# Patient Record
Sex: FEMALE | Race: WHITE
[De-identification: names, ages, dates, MRNs, and addresses within clinical notes are randomized per-mention and may not be internally consistent; named-entity substitution may affect disease eponyms.]

---

## 2018-01-10 ENCOUNTER — HOSPITAL ENCOUNTER (OUTPATIENT)
Dept: HOSPITAL 47 - ORWHC2ENDO | Age: 68
Discharge: HOME | End: 2018-01-10
Payer: MEDICARE

## 2018-01-10 VITALS — RESPIRATION RATE: 16 BRPM

## 2018-01-10 VITALS — HEART RATE: 76 BPM | SYSTOLIC BLOOD PRESSURE: 130 MMHG | DIASTOLIC BLOOD PRESSURE: 65 MMHG

## 2018-01-10 VITALS — BODY MASS INDEX: 38.9 KG/M2

## 2018-01-10 VITALS — TEMPERATURE: 97.6 F

## 2018-01-10 DIAGNOSIS — K21.9: ICD-10-CM

## 2018-01-10 DIAGNOSIS — K57.30: Primary | ICD-10-CM

## 2018-01-10 DIAGNOSIS — Z79.82: ICD-10-CM

## 2018-01-10 DIAGNOSIS — Z88.2: ICD-10-CM

## 2018-01-10 DIAGNOSIS — K44.9: ICD-10-CM

## 2018-01-10 DIAGNOSIS — D64.9: ICD-10-CM

## 2018-01-10 DIAGNOSIS — Z85.828: ICD-10-CM

## 2018-01-10 DIAGNOSIS — Z79.899: ICD-10-CM

## 2018-01-10 DIAGNOSIS — I10: ICD-10-CM

## 2018-01-10 PROCEDURE — 45378 DIAGNOSTIC COLONOSCOPY: CPT

## 2018-01-10 NOTE — P.GSHP
History of Present Illness


H&P Date: 01/10/18


Chief Complaint: Colon cancer screening/anemia





Patient here today for colonoscopy.  She has no bowel related complaints.  No 

rectal bleeding.  She does have findings of anemia.  Last colonoscopy normal.





Past Medical History


Past Medical History: Cancer, GERD/Reflux, Hypertension


Additional Past Medical History / Comment(s): SKIN CA -CHEEK


History of Any Multi-Drug Resistant Organisms: None Reported


Past Surgical History: Cholecystectomy


Additional Past Surgical History / Comment(s): toes nail removed left foot.  

SKIN GRAFT - TO CHEEK FROM BEHIND EAR.  COLONOSCOPY


Past Anesthesia/Blood Transfusion Reactions: Motion Sickness


Smoking Status: Never smoker





- Past Family History


  ** Mother


Family Medical History: No Reported History





Medications and Allergies


 Home Medications











 Medication  Instructions  Recorded  Confirmed  Type


 


Multivitamins, Thera [Multivitamin] 1 each PO DAILY 07/21/14 01/05/18 History


 


Aspirin EC [Ecotrin Low Dose] 81 mg PO DAILY 01/05/18 01/05/18 History


 


Losartan [Cozaar] 50 mg PO HS 01/05/18 01/10/18 History


 


Omeprazole 20 mg PO DAILY PRN 01/05/18 01/10/18 History


 


Vitamin C/Biotin [Hair, Skin and 1 each PO DAILY 01/05/18 01/10/18 History





Nails]    











 Allergies











Allergy/AdvReac Type Severity Reaction Status Date / Time


 


Sulfa (Sulfonamide AdvReac  felt like Verified 01/10/18 08:49





Antibiotics)   ants  





   crawling  





   all over  














Surgical - Exam


 Vital Signs











Temp Pulse Resp BP Pulse Ox


 


 97.6 F   72   18   143/66   97 


 


 01/10/18 08:43  01/10/18 08:43  01/10/18 08:43  01/10/18 08:43  01/10/18 08:43

















Physical exam:


General: Well-developed, well-nourished


HEENT: Normocephalic, sclerae nonicteric


Abdomen: Nontender, nondistended


Extremities: No edema


Neuro: Alert and oriented





Assessment and Plan


(1) Colon cancer screening


Narrative/Plan: 


Will proceed with colonoscopy at this time.  


Current Visit: Yes   Status: Acute   Code(s): Z12.11 - ENCOUNTER FOR SCREENING 

FOR MALIGNANT NEOPLASM OF COLON   SNOMED Code(s): 123151838

## 2018-08-09 ENCOUNTER — HOSPITAL ENCOUNTER (INPATIENT)
Dept: HOSPITAL 47 - EC | Age: 68
LOS: 2 days | Discharge: HOME | DRG: 381 | End: 2018-08-11
Attending: INTERNAL MEDICINE | Admitting: INTERNAL MEDICINE
Payer: MEDICARE

## 2018-08-09 VITALS — BODY MASS INDEX: 36.3 KG/M2

## 2018-08-09 DIAGNOSIS — Z82.0: ICD-10-CM

## 2018-08-09 DIAGNOSIS — Z81.1: ICD-10-CM

## 2018-08-09 DIAGNOSIS — Z80.1: ICD-10-CM

## 2018-08-09 DIAGNOSIS — D62: ICD-10-CM

## 2018-08-09 DIAGNOSIS — Z90.49: ICD-10-CM

## 2018-08-09 DIAGNOSIS — K22.11: Primary | ICD-10-CM

## 2018-08-09 DIAGNOSIS — Z87.81: ICD-10-CM

## 2018-08-09 DIAGNOSIS — K57.90: ICD-10-CM

## 2018-08-09 DIAGNOSIS — K44.9: ICD-10-CM

## 2018-08-09 DIAGNOSIS — Z98.51: ICD-10-CM

## 2018-08-09 DIAGNOSIS — Z88.2: ICD-10-CM

## 2018-08-09 DIAGNOSIS — M17.0: ICD-10-CM

## 2018-08-09 DIAGNOSIS — Z79.82: ICD-10-CM

## 2018-08-09 DIAGNOSIS — M19.91: ICD-10-CM

## 2018-08-09 DIAGNOSIS — Z79.899: ICD-10-CM

## 2018-08-09 DIAGNOSIS — Z82.3: ICD-10-CM

## 2018-08-09 DIAGNOSIS — Z83.3: ICD-10-CM

## 2018-08-09 DIAGNOSIS — K21.9: ICD-10-CM

## 2018-08-09 DIAGNOSIS — I10: ICD-10-CM

## 2018-08-09 DIAGNOSIS — Z85.828: ICD-10-CM

## 2018-08-09 DIAGNOSIS — Z82.49: ICD-10-CM

## 2018-08-09 DIAGNOSIS — Z81.2: ICD-10-CM

## 2018-08-09 LAB
ALBUMIN SERPL-MCNC: 3.8 G/DL (ref 3.5–5)
ALP SERPL-CCNC: 74 U/L (ref 38–126)
ALT SERPL-CCNC: 27 U/L (ref 9–52)
ANION GAP SERPL CALC-SCNC: 9 MMOL/L
AST SERPL-CCNC: 21 U/L (ref 14–36)
BUN SERPL-SCNC: 31 MG/DL (ref 7–17)
CALCIUM SPEC-MCNC: 9 MG/DL (ref 8.4–10.2)
CELLS COUNTED: 100
CHLORIDE SERPL-SCNC: 109 MMOL/L (ref 98–107)
CK SERPL-CCNC: 33 U/L (ref 30–135)
CO2 SERPL-SCNC: 20 MMOL/L (ref 22–30)
ERYTHROCYTE [DISTWIDTH] IN BLOOD BY AUTOMATED COUNT: 2.76 M/UL (ref 3.8–5.4)
ERYTHROCYTE [DISTWIDTH] IN BLOOD: 20.8 % (ref 11.5–15.5)
GLUCOSE SERPL-MCNC: 112 MG/DL (ref 74–99)
HCT VFR BLD AUTO: 19.7 % (ref 34–46)
HGB BLD-MCNC: 5.6 GM/DL (ref 11.4–16)
LYMPHOCYTES # BLD MANUAL: 0.86 K/UL (ref 1–4.8)
MCH RBC QN AUTO: 20.3 PG (ref 25–35)
MCHC RBC AUTO-ENTMCNC: 28.4 G/DL (ref 31–37)
MCV RBC AUTO: 71.5 FL (ref 80–100)
MONOCYTES # BLD MANUAL: 0.1 K/UL (ref 0–1)
NEUTROPHILS NFR BLD MANUAL: 90 %
NEUTS SEG # BLD MANUAL: 8.55 K/UL (ref 1.3–7.7)
PLATELET # BLD AUTO: 421 K/UL (ref 150–450)
POTASSIUM SERPL-SCNC: 4.9 MMOL/L (ref 3.5–5.1)
PROT SERPL-MCNC: 6.6 G/DL (ref 6.3–8.2)
SODIUM SERPL-SCNC: 138 MMOL/L (ref 137–145)
TROPONIN I SERPL-MCNC: <0.012 NG/ML (ref 0–0.03)
WBC # BLD AUTO: 9.5 K/UL (ref 3.8–10.6)

## 2018-08-09 PROCEDURE — 85027 COMPLETE CBC AUTOMATED: CPT

## 2018-08-09 PROCEDURE — 86900 BLOOD TYPING SEROLOGIC ABO: CPT

## 2018-08-09 PROCEDURE — 85730 THROMBOPLASTIN TIME PARTIAL: CPT

## 2018-08-09 PROCEDURE — 83036 HEMOGLOBIN GLYCOSYLATED A1C: CPT

## 2018-08-09 PROCEDURE — 86850 RBC ANTIBODY SCREEN: CPT

## 2018-08-09 PROCEDURE — 96374 THER/PROPH/DIAG INJ IV PUSH: CPT

## 2018-08-09 PROCEDURE — 71046 X-RAY EXAM CHEST 2 VIEWS: CPT

## 2018-08-09 PROCEDURE — 83605 ASSAY OF LACTIC ACID: CPT

## 2018-08-09 PROCEDURE — 82553 CREATINE MB FRACTION: CPT

## 2018-08-09 PROCEDURE — 85025 COMPLETE CBC W/AUTO DIFF WBC: CPT

## 2018-08-09 PROCEDURE — 82550 ASSAY OF CK (CPK): CPT

## 2018-08-09 PROCEDURE — 86901 BLOOD TYPING SEROLOGIC RH(D): CPT

## 2018-08-09 PROCEDURE — 36415 COLL VENOUS BLD VENIPUNCTURE: CPT

## 2018-08-09 PROCEDURE — 93005 ELECTROCARDIOGRAM TRACING: CPT

## 2018-08-09 PROCEDURE — 80053 COMPREHEN METABOLIC PANEL: CPT

## 2018-08-09 PROCEDURE — 84484 ASSAY OF TROPONIN QUANT: CPT

## 2018-08-09 PROCEDURE — 86920 COMPATIBILITY TEST SPIN: CPT

## 2018-08-09 PROCEDURE — 82272 OCCULT BLD FECES 1-3 TESTS: CPT

## 2018-08-09 PROCEDURE — 99285 EMERGENCY DEPT VISIT HI MDM: CPT

## 2018-08-09 PROCEDURE — 96361 HYDRATE IV INFUSION ADD-ON: CPT

## 2018-08-09 PROCEDURE — 43235 EGD DIAGNOSTIC BRUSH WASH: CPT

## 2018-08-09 NOTE — XR
EXAMINATION TYPE: XR chest 2V

 

DATE OF EXAM: 8/9/2018

 

COMPARISON: 4/25/2013

 

HISTORY: Short of breath

 

TECHNIQUE:  Frontal and lateral views of the chest are obtained.

 

FINDINGS:  Heart and mediastinum are normal. Lungs are clear. Diaphragm is normal. There is a large h
iatal hernia. There is no pleural effusion. Bony thorax appears intact.

 

IMPRESSION:  No active cardiopulmonary disease. Hiatal hernia. No change.

## 2018-08-09 NOTE — ED
Recheck HPI





- General


Chief Complaint: Recheck/Abnormal Lab/Rx


Stated Complaint: sent by PCP Low [facility]


Time Seen by Provider: 08/09/18 21:59


Source: patient


Mode of arrival: ambulatory


Limitations: no limitations





- History of Present Illness


Initial Comments: 


68 years old female complaining about low hemoglobin and she said she had a 

black tarry stool for last 3-4 weeks, she had some blood work done and now 

today got a call from the family doctor and advised her to come to the ER his 

hemoglobin was quite low, she is on aspirin area down denies any headaches no 

neck stiffness no chest pain or shortness of breath or symptoms of TIA or CVA.








- Related Data


 Home Medications











 Medication  Instructions  Recorded  Confirmed


 


Multivitamins, Thera [Multivitamin] 1 each PO DAILY 07/21/14 08/09/18


 


Aspirin EC [Ecotrin Low Dose] 81 mg PO DAILY 01/05/18 08/09/18


 


Losartan [Cozaar] 50 mg PO HS 01/05/18 08/09/18


 


Omeprazole 20 mg PO DAILY PRN 01/05/18 08/09/18


 


Vitamin C/Biotin [Hair, Skin and 1 each PO DAILY 01/05/18 08/09/18





Nails]   


 


Calcium Citrate/Vitamin D3 1 tab PO DAILY 08/09/18 08/09/18





[Calcitrate + Vit D Caplet]   


 


Prenatal Vit-Iron-Folic Acid 1 cap PO DAILY 08/09/18 08/09/18





[Prenatal-U Capsule (formulary)]   











 Allergies











Allergy/AdvReac Type Severity Reaction Status Date / Time


 


Sulfa (Sulfonamide AdvReac  felt like Verified 08/09/18 22:28





Antibiotics)   ants  





   crawling  





   all over  














Review of Systems


ROS Statement: 


Those systems with pertinent positive or pertinent negative responses have been 

documented in the HPI.





ROS Other: All systems not noted in ROS Statement are negative.





Past Medical History


Past Medical History: Cancer, GERD/Reflux, Hypertension


Additional Past Medical History / Comment(s): SKIN CA -CHEEK


History of Any Multi-Drug Resistant Organisms: None Reported


Past Surgical History: Cholecystectomy


Additional Past Surgical History / Comment(s): toes nail removed left foot.  

SKIN GRAFT - TO CHEEK FROM BEHIND EAR.  COLONOSCOPY


Past Anesthesia/Blood Transfusion Reactions: Motion Sickness


Past Psychological History: No Psychological Hx Reported


Smoking Status: Never smoker





- Past Family History


  ** Mother


Family Medical History: No Reported History





General Exam





- General Exam Comments


Initial Comments: 


General:  The patient is awake and alert, in no distress, and does not appear 

acutely ill. 


Skin:  Skin is warm and dry and no rashes or lesions are noted. 


Eye:  Pupils are equal, round and reactive to light, extra-ocular movements are 

intact; there is normal conjunctiva bilaterally.  


Ears, nose, mouth and throat:  There are moist mucous membranes and no oral 

lesions. 


Neck:  The neck is supple, there is no tenderness  or JVD.  


Cardiovascular:  There is a regular rate and rhythm. No murmur, rub or gallop 

is appreciated.


Respiratory: To auscultation bilateral, no wheezing no rhonchi no distress  

respiratory wise noticed


Gastrointestinal: tender in epigastric area 


Back:  There is no tenderness to palpation in the midline. There is no obvious 

deformity.


Musculoskeletal:  Normal ROM, no tenderness, There is no pedal edema. There is 

no calf tenderness or swelling. No cords were appreciated.  


Neurological:  CN II-XII intact, Cranial nerves III through XII are intact. 

There are no obvious motor or sensory deficits. Coordination appears grossly 

intact. Speech is normal.


Psychiatric:  Cooperative, appropriate mood & affect, normal judgment.  








Limitations: no limitations





Course


 Vital Signs











  08/09/18 08/09/18 08/09/18





  21:31 22:29 22:41


 


Temperature 98.3 F  


 


Pulse Rate 90  92


 


Respiratory 20 18 18





Rate   


 


Blood Pressure 126/55  104/59


 


O2 Sat by Pulse 100  99





Oximetry   














  08/09/18 08/10/18 08/10/18





  23:00 00:00 00:31


 


Temperature   98.9 F


 


Pulse Rate 82 85 85


 


Respiratory 20 20 20





Rate   


 


Blood Pressure 128/53 135/54 102/52


 


O2 Sat by Pulse 100 100 100





Oximetry   














  08/10/18





  00:34


 


Temperature 98.3 F


 


Pulse Rate 92


 


Respiratory 20





Rate 


 


Blood Pressure 122/64


 


O2 Sat by Pulse 100





Oximetry 








EKG is normal sinus ventricular rate is 85 NJ interval is 134 QRS duration is 

94 QT/QTc is 394/460 review of this EKG does not reveal any ST elevation or ST 

depression





Medical Decision Making





- Lab Data


Result diagrams: 


 08/09/18 22:26





 08/09/18 22:26


 Lab Results











  08/09/18 08/09/18 08/09/18 Range/Units





  22:26 22:26 22:26 


 


WBC   9.5   (3.8-10.6)  k/uL


 


RBC   2.76 L   (3.80-5.40)  m/uL


 


Hgb   5.6 L*   (11.4-16.0)  gm/dL


 


Hct   19.7 L*   (34.0-46.0)  %


 


MCV   71.5 L   (80.0-100.0)  fL


 


MCH   20.3 L   (25.0-35.0)  pg


 


MCHC   28.4 L   (31.0-37.0)  g/dL


 


RDW   20.8 H   (11.5-15.5)  %


 


Plt Count   421   (150-450)  k/uL


 


Neutrophils % (Manual)   90   %


 


Lymphocytes % (Manual)   9   %


 


Monocytes % (Manual)   1   %


 


Neutrophils # (Manual)   8.55 H   (1.3-7.7)  k/uL


 


Lymphocytes # (Manual)   0.86 L   (1.0-4.8)  k/uL


 


Monocytes # (Manual)   0.10   (0-1.0)  k/uL


 


Nucleated RBCs   0   (0-0)  /100 WBC


 


Manual Slide Review   Performed   


 


Polychromasia   Present   


 


Hypochromasia   Marked   


 


Poikilocytosis   Moderate   


 


Anisocytosis   Moderate   


 


Microcytosis   Marked   


 


APTT     (22.0-30.0)  sec


 


Sodium    138  (137-145)  mmol/L


 


Potassium    4.9  (3.5-5.1)  mmol/L


 


Chloride    109 H  ()  mmol/L


 


Carbon Dioxide    20 L  (22-30)  mmol/L


 


Anion Gap    9  mmol/L


 


BUN    31 H  (7-17)  mg/dL


 


Creatinine    1.10 H  (0.52-1.04)  mg/dL


 


Est GFR (CKD-EPI)AfAm    59  (>60 ml/min/1.73 sqM)  


 


Est GFR (CKD-EPI)NonAf    51  (>60 ml/min/1.73 sqM)  


 


Glucose    112 H  (74-99)  mg/dL


 


Plasma Lactic Acid Yomi     (0.7-2.0)  mmol/L


 


Calcium    9.0  (8.4-10.2)  mg/dL


 


Total Bilirubin    0.5  (0.2-1.3)  mg/dL


 


AST    21  (14-36)  U/L


 


ALT    27  (9-52)  U/L


 


Alkaline Phosphatase    74  ()  U/L


 


Total Creatine Kinase  33    ()  U/L


 


CK-MB (CK-2)  0.4    (0.0-2.4)  ng/mL


 


CK-MB (CK-2) Rel Index  1.2    


 


Troponin I  <0.012    (0.000-0.034)  ng/mL


 


Total Protein    6.6  (6.3-8.2)  g/dL


 


Albumin    3.8  (3.5-5.0)  g/dL


 


Stool Occult Blood     (Negative)  


 


Blood Type     


 


Blood Type Confirm     


 


Blood Type Recheck     


 


Antibody Screen     


 


Crossmatch     


 


Spec Expiration Date     














  08/09/18 08/09/18 08/09/18 Range/Units





  22:26 22:26 22:27 


 


WBC     (3.8-10.6)  k/uL


 


RBC     (3.80-5.40)  m/uL


 


Hgb     (11.4-16.0)  gm/dL


 


Hct     (34.0-46.0)  %


 


MCV     (80.0-100.0)  fL


 


MCH     (25.0-35.0)  pg


 


MCHC     (31.0-37.0)  g/dL


 


RDW     (11.5-15.5)  %


 


Plt Count     (150-450)  k/uL


 


Neutrophils % (Manual)     %


 


Lymphocytes % (Manual)     %


 


Monocytes % (Manual)     %


 


Neutrophils # (Manual)     (1.3-7.7)  k/uL


 


Lymphocytes # (Manual)     (1.0-4.8)  k/uL


 


Monocytes # (Manual)     (0-1.0)  k/uL


 


Nucleated RBCs     (0-0)  /100 WBC


 


Manual Slide Review     


 


Polychromasia     


 


Hypochromasia     


 


Poikilocytosis     


 


Anisocytosis     


 


Microcytosis     


 


APTT     (22.0-30.0)  sec


 


Sodium     (137-145)  mmol/L


 


Potassium     (3.5-5.1)  mmol/L


 


Chloride     ()  mmol/L


 


Carbon Dioxide     (22-30)  mmol/L


 


Anion Gap     mmol/L


 


BUN     (7-17)  mg/dL


 


Creatinine     (0.52-1.04)  mg/dL


 


Est GFR (CKD-EPI)AfAm     (>60 ml/min/1.73 sqM)  


 


Est GFR (CKD-EPI)NonAf     (>60 ml/min/1.73 sqM)  


 


Glucose     (74-99)  mg/dL


 


Plasma Lactic Acid Yomi     (0.7-2.0)  mmol/L


 


Calcium     (8.4-10.2)  mg/dL


 


Total Bilirubin     (0.2-1.3)  mg/dL


 


AST     (14-36)  U/L


 


ALT     (9-52)  U/L


 


Alkaline Phosphatase     ()  U/L


 


Total Creatine Kinase     ()  U/L


 


CK-MB (CK-2)     (0.0-2.4)  ng/mL


 


CK-MB (CK-2) Rel Index     


 


Troponin I     (0.000-0.034)  ng/mL


 


Total Protein     (6.3-8.2)  g/dL


 


Albumin     (3.5-5.0)  g/dL


 


Stool Occult Blood  Positive H    (Negative)  


 


Blood Type   O Positive   


 


Blood Type Confirm    O Positive  


 


Blood Type Recheck   CABO Indicated   


 


Antibody Screen   NEGATIVE   


 


Crossmatch   See Detail   


 


Spec Expiration Date   08/12/2018 - 2326 08/09/18 08/09/18 Range/Units





  22:33 22:33 


 


WBC    (3.8-10.6)  k/uL


 


RBC    (3.80-5.40)  m/uL


 


Hgb    (11.4-16.0)  gm/dL


 


Hct    (34.0-46.0)  %


 


MCV    (80.0-100.0)  fL


 


MCH    (25.0-35.0)  pg


 


MCHC    (31.0-37.0)  g/dL


 


RDW    (11.5-15.5)  %


 


Plt Count    (150-450)  k/uL


 


Neutrophils % (Manual)    %


 


Lymphocytes % (Manual)    %


 


Monocytes % (Manual)    %


 


Neutrophils # (Manual)    (1.3-7.7)  k/uL


 


Lymphocytes # (Manual)    (1.0-4.8)  k/uL


 


Monocytes # (Manual)    (0-1.0)  k/uL


 


Nucleated RBCs    (0-0)  /100 WBC


 


Manual Slide Review    


 


Polychromasia    


 


Hypochromasia    


 


Poikilocytosis    


 


Anisocytosis    


 


Microcytosis    


 


APTT  19.8 L   (22.0-30.0)  sec


 


Sodium    (137-145)  mmol/L


 


Potassium    (3.5-5.1)  mmol/L


 


Chloride    ()  mmol/L


 


Carbon Dioxide    (22-30)  mmol/L


 


Anion Gap    mmol/L


 


BUN    (7-17)  mg/dL


 


Creatinine    (0.52-1.04)  mg/dL


 


Est GFR (CKD-EPI)AfAm    (>60 ml/min/1.73 sqM)  


 


Est GFR (CKD-EPI)NonAf    (>60 ml/min/1.73 sqM)  


 


Glucose    (74-99)  mg/dL


 


Plasma Lactic Acid Yomi   0.9  (0.7-2.0)  mmol/L


 


Calcium    (8.4-10.2)  mg/dL


 


Total Bilirubin    (0.2-1.3)  mg/dL


 


AST    (14-36)  U/L


 


ALT    (9-52)  U/L


 


Alkaline Phosphatase    ()  U/L


 


Total Creatine Kinase    ()  U/L


 


CK-MB (CK-2)    (0.0-2.4)  ng/mL


 


CK-MB (CK-2) Rel Index    


 


Troponin I    (0.000-0.034)  ng/mL


 


Total Protein    (6.3-8.2)  g/dL


 


Albumin    (3.5-5.0)  g/dL


 


Stool Occult Blood    (Negative)  


 


Blood Type    


 


Blood Type Confirm    


 


Blood Type Recheck    


 


Antibody Screen    


 


Crossmatch    


 


Spec Expiration Date    














Disposition


Clinical Impression: 


 GI bleed





Disposition: ADMITTED AS IP TO THIS [facility]


Condition: Good


Referrals: 


Vinay Alicia MD [Primary Care Provider] - 1-2 days

## 2018-08-10 LAB
ERYTHROCYTE [DISTWIDTH] IN BLOOD BY AUTOMATED COUNT: 2.92 M/UL (ref 3.8–5.4)
ERYTHROCYTE [DISTWIDTH] IN BLOOD BY AUTOMATED COUNT: 3.41 M/UL (ref 3.8–5.4)
ERYTHROCYTE [DISTWIDTH] IN BLOOD BY AUTOMATED COUNT: 3.47 M/UL (ref 3.8–5.4)
ERYTHROCYTE [DISTWIDTH] IN BLOOD: 20.4 % (ref 11.5–15.5)
ERYTHROCYTE [DISTWIDTH] IN BLOOD: 20.6 % (ref 11.5–15.5)
ERYTHROCYTE [DISTWIDTH] IN BLOOD: 21.6 % (ref 11.5–15.5)
HBA1C MFR BLD: 5.5 % (ref 4–6)
HCT VFR BLD AUTO: 22.7 % (ref 34–46)
HCT VFR BLD AUTO: 26.1 % (ref 34–46)
HCT VFR BLD AUTO: 26.5 % (ref 34–46)
HGB BLD-MCNC: 6.8 GM/DL (ref 11.4–16)
HGB BLD-MCNC: 8.1 GM/DL (ref 11.4–16)
HGB BLD-MCNC: 8.1 GM/DL (ref 11.4–16)
MCH RBC QN AUTO: 23.1 PG (ref 25–35)
MCH RBC QN AUTO: 23.4 PG (ref 25–35)
MCH RBC QN AUTO: 23.7 PG (ref 25–35)
MCHC RBC AUTO-ENTMCNC: 29.7 G/DL (ref 31–37)
MCHC RBC AUTO-ENTMCNC: 30.6 G/DL (ref 31–37)
MCHC RBC AUTO-ENTMCNC: 31 G/DL (ref 31–37)
MCV RBC AUTO: 76.5 FL (ref 80–100)
MCV RBC AUTO: 76.7 FL (ref 80–100)
MCV RBC AUTO: 78 FL (ref 80–100)
PLATELET # BLD AUTO: 313 K/UL (ref 150–450)
PLATELET # BLD AUTO: 327 K/UL (ref 150–450)
PLATELET # BLD AUTO: 340 K/UL (ref 150–450)
WBC # BLD AUTO: 6.1 K/UL (ref 3.8–10.6)
WBC # BLD AUTO: 7.1 K/UL (ref 3.8–10.6)
WBC # BLD AUTO: 8.3 K/UL (ref 3.8–10.6)

## 2018-08-10 PROCEDURE — 30233N1 TRANSFUSION OF NONAUTOLOGOUS RED BLOOD CELLS INTO PERIPHERAL VEIN, PERCUTANEOUS APPROACH: ICD-10-PCS

## 2018-08-10 RX ADMIN — CEFAZOLIN SCH MLS/HR: 330 INJECTION, POWDER, FOR SOLUTION INTRAMUSCULAR; INTRAVENOUS at 10:06

## 2018-08-10 RX ADMIN — PANTOPRAZOLE SODIUM SCH MG: 40 INJECTION, POWDER, FOR SOLUTION INTRAVENOUS at 20:11

## 2018-08-10 RX ADMIN — Medication SCH EACH: at 10:06

## 2018-08-10 RX ADMIN — CEFAZOLIN SCH: 330 INJECTION, POWDER, FOR SOLUTION INTRAMUSCULAR; INTRAVENOUS at 12:16

## 2018-08-10 RX ADMIN — CEFAZOLIN SCH MLS/HR: 330 INJECTION, POWDER, FOR SOLUTION INTRAMUSCULAR; INTRAVENOUS at 20:12

## 2018-08-10 RX ADMIN — PANTOPRAZOLE SODIUM SCH MG: 40 INJECTION, POWDER, FOR SOLUTION INTRAVENOUS at 12:54

## 2018-08-10 RX ADMIN — PRENATAL W/O A VIT W/ FE FUMARATE-FA CAP 106.5-1 MG SCH EACH: 106.5 CAPSULE ORAL at 10:06

## 2018-08-10 NOTE — P.HPIM
History of Present Illness


H&P Date: 08/10/18


Chief Complaint: Low hemoglobin





This is a 68-year-old  female patient of Dr. Alicia with past 

medical history of gastroesophageal reflux disease, hypertension, skin cancer 

on her cheek, patient gives history that she had lab work done with Dr. Dejesus 

would and then she received a call that her hemoglobin was low and come into 

the emergency center for evaluation.  Patient has had black tarry stools for 

the past 3-4 weeks.  Patient was found to have a hemoglobin of 5.6.  Vital 

signs were stable.  Patient was started on IV Protonix, consult placed with GI 

for EGD she had a screening colonoscopy in 2018 with Dr. Pena that 

showed diverticulosis. Patient has been placed nothing by mouth status.





Review of Systems


All systems: negative


Constitutional: Denies chills, Denies fever, Denies poor appetite, Denies 

weakness


Eyes: denies blurred vision, denies pain


Ears, nose, mouth and throat: Denies headache, Denies sore throat


Cardiovascular: Denies chest pain, Denies shortness of breath, Denies syncope


Respiratory: Denies cough, Denies cough with sputum, Denies dyspnea, Denies 

wheezing


Gastrointestinal: Reports melena, Denies abdominal pain, Denies diarrhea, 

Denies nausea, Denies vomiting


Genitourinary: Denies dysuria, Denies hematuria


Musculoskeletal: Denies myalgias


Integumentary: Denies pruritus, Denies rash


Neurological: Denies numbness, Denies weakness


Psychiatric: Denies anxiety, Denies depression


Endocrine: Denies fatigue, Denies weight change





Past Medical History


Past Medical History: Cancer, GERD/Reflux, Hypertension, Osteoarthritis (OA)


Additional Past Medical History / Comment(s): Anemia, hiatal hernia, 

diverticulosis, basal cell skin cancer R cheek, nose and back-removed, 

arthritis bilateral knees, L clavicle fracture as a child.


History of Any Multi-Drug Resistant Organisms: None Reported


Past Surgical History: Cholecystectomy, Orthopedic Surgery, Tubal Ligation


Additional Past Surgical History / Comment(s): 1/10/18 colonoscopy and EGD with 

prior colonoscopies, R cheek skin cancer removal with graft, nose and back skin 

cancer removals, D&C, cold knife conization for precancer, R knee arthroscopy


Past Anesthesia/Blood Transfusion Reactions: No Reported Reaction


Smoking Status: Never smoker


Additional Past Alcohol Use History / Comment(s): Patient is a lifelong 

nonsmoker.  No illicit drug use.  Occasional alcohol use.  Patient was employed 

as a  at [employer] and is currently retired.





- Past Family History


  ** Mother


Family Medical History: CVA/TIA, Diabetes Mellitus


Additional Family Medical History / Comment(s): Mother  from a CVA at the 

age of 83 yrs.  She had NIDDM.





  ** Father


Family Medical History: Myocardial Infarction (MI)


Additional Family Medical History / Comment(s): Father  of a MI at the age 

of 60yrs.





  ** Brother(s)


Additional Family Medical History / Comment(s): Patient has total of 10 

siblings.  One brother  age 70 from coronary artery disease, one  at 

age 68 from alcohol complications.  One  due to a pedestrian motor vehicle 

accident.  One brother is alive with no major medical problems.





  ** Sister(s)


Additional Family Medical History / Comment(s): Patient has 1 sister that  

from lung cancer with history of smoking, one sister  from MS, one  

from female cancer, one  from an accident and one  as an infant.





Medications and Allergies


 Home Medications











 Medication  Instructions  Recorded  Confirmed  Type


 


Multivitamins, Thera [Multivitamin] 1 each PO DAILY 14 History


 


Aspirin EC [Ecotrin Low Dose] 81 mg PO DAILY 18 History


 


Losartan [Cozaar] 50 mg PO HS 18 History


 


Omeprazole 20 mg PO DAILY PRN 18 History


 


Vitamin C/Biotin [Hair, Skin and 1 each PO DAILY 18 History





Nails]    


 


Calcium Citrate/Vitamin D3 1 tab PO DAILY 18 History





[Calcitrate + Vit D Caplet]    


 


Prenatal Vit-Iron-Folic Acid 1 cap PO DAILY 18 History





[Prenatal-U Capsule (formulary)]    











 Allergies











Allergy/AdvReac Type Severity Reaction Status Date / Time


 


Sulfa (Sulfonamide AdvReac  felt like Verified 18 22:28





Antibiotics)   ants  





   crawling  





   all over  














Physical Exam


Vitals: 


 Vital Signs











  Temp Pulse Pulse Resp BP BP Pulse Ox


 


 08/10/18 12:50  98.4 F  87   18  115/65   100


 


 08/10/18 12:40  98.4 F  81   18  113/65   98


 


 08/10/18 12:00  98.4 F   82  18   113/65  98


 


 08/10/18 10:03  98 F  80   16  141/87   100


 


 08/10/18 06:40   80   18  104/36   100


 


 08/10/18 04:10  98.8 F  79   16  109/39   100


 


 08/10/18 04:05  97.9 F  84   18  109/39   100


 


 08/10/18 01:14  98.1 F  92   17  108/87   100


 


 08/10/18 00:44  98.3 F  87   20  123/51   100


 


 08/10/18 00:34  98.3 F  92   20  122/64   100


 


 08/10/18 00:31  98.9 F  85   20  102/52   100


 


 08/10/18 00:00   85   20  135/54   100


 


 18 23:00   82   20  128/53   100


 


 18 22:41   92   18  104/59   99


 


 18 22:29     18   


 


 18 21:31  98.3 F  90   20  126/55   100








 Intake and Output











 08/09/18 08/10/18 08/10/18





 22:59 06:59 14:59


 


Intake Total  310 0


 


Balance  310 0


 


Intake:   


 


  Blood Product  310 0


 


    Rc As-1  Unit  310 





    [id number]   


 


    Rc As-1  Unit   0





    [id number]   


 


Other:   


 


  Weight 92.986 kg  92.986 kg

















Gen: This is a 68-year-old  female.  She is sitting up in bed appears 

to be comfortable in no acute distress.


HEENT: Head is atraumatic, normocephalic. Pupils equal, round. Sclerae is 

anicteric.  Return to the following panel.


NECK: Supple. No JVD. No lymphadenopathy. No thyromegaly. 


LUNGS: Clear to auscultation. No wheezes or rhonchi.  No intercostal 

retractions.


HEART: Regular rate and rhythm. No murmur. 


ABDOMEN: Soft. Bowel sounds are present. No masses.  No tenderness.


EXTREMITIES: No pedal edema.  No calf tenderness.


NEUROLOGICAL: Patient is awake, alert and oriented x3. Cranial nerves 2 through 

12 are grossly intact. 








Results


CBC & Chem 7: 


 08/10/18 04:56





 18 22:26


Labs: 


 Abnormal Lab Results - Last 24 Hours (Table)











  18 Range/Units





  22:26 22:26 22:26 


 


RBC  2.76 L    (3.80-5.40)  m/uL


 


Hgb  5.6 L*    (11.4-16.0)  gm/dL


 


Hct  19.7 L*    (34.0-46.0)  %


 


MCV  71.5 L    (80.0-100.0)  fL


 


MCH  20.3 L    (25.0-35.0)  pg


 


MCHC  28.4 L    (31.0-37.0)  g/dL


 


RDW  20.8 H    (11.5-15.5)  %


 


Neutrophils # (Manual)  8.55 H    (1.3-7.7)  k/uL


 


Lymphocytes # (Manual)  0.86 L    (1.0-4.8)  k/uL


 


APTT     (22.0-30.0)  sec


 


Chloride   109 H   ()  mmol/L


 


Carbon Dioxide   20 L   (22-30)  mmol/L


 


BUN   31 H   (7-17)  mg/dL


 


Creatinine   1.10 H   (0.52-1.04)  mg/dL


 


Glucose   112 H   (74-99)  mg/dL


 


Stool Occult Blood    Positive H  (Negative)  


 


Crossmatch     














  08/09/18 08/09/18 08/10/18 Range/Units





  22:26 22:33 04:56 


 


RBC    2.92 L  (3.80-5.40)  m/uL


 


Hgb    6.8 L*  (11.4-16.0)  gm/dL


 


Hct    22.7 L  (34.0-46.0)  %


 


MCV    78.0 L D  (80.0-100.0)  fL


 


MCH    23.1 L  (25.0-35.0)  pg


 


MCHC    29.7 L  (31.0-37.0)  g/dL


 


RDW    21.6 H  (11.5-15.5)  %


 


Neutrophils # (Manual)     (1.3-7.7)  k/uL


 


Lymphocytes # (Manual)     (1.0-4.8)  k/uL


 


APTT   19.8 L   (22.0-30.0)  sec


 


Chloride     ()  mmol/L


 


Carbon Dioxide     (22-30)  mmol/L


 


BUN     (7-17)  mg/dL


 


Creatinine     (0.52-1.04)  mg/dL


 


Glucose     (74-99)  mg/dL


 


Stool Occult Blood     (Negative)  


 


Crossmatch  See Detail    














Thrombosis Risk Factor Assmnt





- DVT/VTE Prophylaxis


DVT/VTE Prophylaxis: Mechanical Prophylaxis ordered





- Choose All That Apply


Any of the Below Risk Factors Present?: Yes


Each Factor Represents 1 point: Obesity (BMI >25)


Other Risk Factors: Yes


Each Risk Factor Represents 2 Points: Age 61-74 years, Malignancy


Other congenital or acquired thrombophilia - If yes, enter type in comment: No


Thrombosis Risk Factor Assessment Total Risk Factor Score: 5


Thrombosis Risk Factor Assessment Level: High Risk





Assessment and Plan


Plan: 





1.  Acute GI bleed with acute blood loss anemia status post transfusion of 2 

units of packed RBCs.  GI consult for EGD.  Continue Protonix 40 mg IV twice 

daily, IV fluids 0.9 normal saline at 100 mL per hour.  CBC every 8 hours.  

Patient is currently nothing by mouth. 





2.  Hypertension.  Continue losartan 50 mg at bedtime.





3.  History of gastroesophageal reflux disease.  Patient is currently on 

Protonix.





4.  History of skin cancer, stable.





5.  DVT prophylaxis.  SCDs and SU hose..








Patient will be admitted to the hospital for a minimum of 2 night stay.


Discharge plan: Return home


Impression and plan of care have been directed as dictated by the signing 

physician.  Erma Wells nurse practitioner acting as scribe for signing 

physician.

## 2018-08-10 NOTE — CONS
CONSULTATION



Mrs. Subramanian is a 68-year-old female, who is seen for cardiac evaluation.  This patient

was asked to come to the emergency room because her hemoglobin was low. Patient has

been having black tarry stools for last 3-4 weeks and the patient's hemoglobin was

significantly low and so the patient was advised to come over here.  The patient claims

that she is not taking aspirin.  The patient has a history of hypertension. There is no

history of diabetes. The patient denies any prior history of myocardial infarction.



HOME MEDICATIONS:

Include Cozaar, omeprazole and calcium.



PAST MEDICAL HISTORY:

Includes a history of cholecystectomy. Patient had a recent colonoscopy this year which

showed evidence of diverticulosis but no other abnormality has been detected. The

patient did not have any upper GI endoscopy done recently.



PHYSICAL EXAMINATION:

At present reveals a 68-year-old female, who does not appear to be in any acute

distress.  The patient's blood pressure is 130/60 mmHg.

HEENT examination is negative.  Neck is supple.  There is no increase in jugular venous

pressure.  Both the carotid pulses are felt.  There is no bruit. Chest is symmetrical.

Heart: The PMI is not felt.  First and second heart sounds are normal.  There is no

evidence of any murmur. Lungs are clinically clear to auscultation and percussion.

Abdomen is soft.  Liver and spleen are not enlarged.  Bowel sounds are heard.

Extremities: Peripheral pulses are 2+.



EKG shows normal sinus rhythm without any acute ischemic changes.  The patient does

have a grade 2/6 at the apex.  Ejection systolic murmur which is suggestive of mild

aortic stenosis.



FINAL IMPRESSION:

This patient is admitted with a history suggestive of upper GI bleeding.  Patient has

significantly low hemoglobin and has a microcytic hypochromic anemia.  The patient may

need upper GI endoscopy to rule out any peptic ulcer disease.  The patient is stable

cardiac wise at present. We will continue her medications and she is advised to stay

away from aspirin and nonsteroidal anti-inflammatory medications.



MMODL / IJN: [id number] / Job#: [id number]

## 2018-08-11 VITALS
SYSTOLIC BLOOD PRESSURE: 112 MMHG | HEART RATE: 77 BPM | TEMPERATURE: 97.8 F | RESPIRATION RATE: 16 BRPM | DIASTOLIC BLOOD PRESSURE: 70 MMHG

## 2018-08-11 LAB
ERYTHROCYTE [DISTWIDTH] IN BLOOD BY AUTOMATED COUNT: 3.27 M/UL (ref 3.8–5.4)
ERYTHROCYTE [DISTWIDTH] IN BLOOD: 20.5 % (ref 11.5–15.5)
HCT VFR BLD AUTO: 25.4 % (ref 34–46)
HGB BLD-MCNC: 7.6 GM/DL (ref 11.4–16)
MCH RBC QN AUTO: 23.3 PG (ref 25–35)
MCHC RBC AUTO-ENTMCNC: 30 G/DL (ref 31–37)
MCV RBC AUTO: 77.7 FL (ref 80–100)
PLATELET # BLD AUTO: 315 K/UL (ref 150–450)
WBC # BLD AUTO: 5.8 K/UL (ref 3.8–10.6)

## 2018-08-11 PROCEDURE — 0DJ08ZZ INSPECTION OF UPPER INTESTINAL TRACT, VIA NATURAL OR ARTIFICIAL OPENING ENDOSCOPIC: ICD-10-PCS

## 2018-08-11 RX ADMIN — PANTOPRAZOLE SODIUM SCH MG: 40 INJECTION, POWDER, FOR SOLUTION INTRAVENOUS at 10:25

## 2018-08-11 RX ADMIN — Medication SCH EACH: at 10:25

## 2018-08-11 RX ADMIN — CEFAZOLIN SCH MLS/HR: 330 INJECTION, POWDER, FOR SOLUTION INTRAMUSCULAR; INTRAVENOUS at 06:27

## 2018-08-11 RX ADMIN — PRENATAL W/O A VIT W/ FE FUMARATE-FA CAP 106.5-1 MG SCH EACH: 106.5 CAPSULE ORAL at 10:25

## 2018-08-11 NOTE — P.DS
Providers


Date of admission: 


08/10/18 00:50





Attending physician: 


Nadia Arciniega MD





Consults: 





 





08/10/18 01:33


Consult Physician Stat 


   Consulting Provider: Kyara Bobby


   Consult Reason/Comments: GI bleed


   Do you want consulting provider notified?: Yes











Primary care physician: 


[facility]inay [facility] Course: 





This is 68 years old female with past medical history significant for 

hypertension presents to the emergency department with fatigue and weakness and 

low hemoglobin identified GI was consulted and patient underwent upper 

endoscopy which showed hiatal hernia and possible small ulcer patient 

hemoglobin continued to be stable during this hospital stay tolerated diet and 

felt stable from the medical standpoint for discharge as patient had recent 

colonoscopy back in January 2017 that showed normal finding at that time 

according to her story.  Patient was evaluated by GI who recommended proton 

pump inhibitors at the time of the discharge cardiology agreed to hold aspirin 

and patient was discharged in stable condition


Patient Condition at Discharge: Good





Plan - Discharge Summary


Discharge Rx Participant: No


New Discharge Prescriptions: 


Continue


   Pantoprazole Sodium [Protonix] 1 tab PO BID 30 Days #60 tablet.


   Sucralfate [Carafate] 10 ml PO Q6H 30 Days #120 oral.susp





No Action


   Multivitamins, Thera [Multivitamin] 1 each PO DAILY


   Losartan [Cozaar] 50 mg PO HS


   Vitamin C/Biotin [Hair, Skin and Nails] 1 each PO DAILY


   Prenatal Vit-Iron-Folic Acid [Prenatal-U Capsule (formulary)] 1 cap PO DAILY


   Calcium Citrate/Vitamin D3 [Calcitrate + Vit D Caplet] 1 tab PO DAILY


   Pantoprazole Sodium [Protonix] 1 tab PO BID


Discharge Medication List





Multivitamins, Thera [Multivitamin] 1 each PO DAILY 07/21/14 [History]


Losartan [Cozaar] 50 mg PO HS 01/05/18 [History]


Vitamin C/Biotin [Hair, Skin and Nails] 1 each PO DAILY 01/05/18 [History]


Calcium Citrate/Vitamin D3 [Calcitrate + Vit D Caplet] 1 tab PO DAILY 08/09/18 [

History]


Prenatal Vit-Iron-Folic Acid [Prenatal-U Capsule (formulary)] 1 cap PO DAILY 08/ 09/18 [History]


Pantoprazole Sodium [Protonix] 1 tab PO BID 08/11/18 [History]


Pantoprazole Sodium [Protonix] 1 tab PO BID 30 Days #60 tablet.dr 08/11/18 [Rx]


Sucralfate [Carafate] 10 ml PO Q6H 30 Days #120 oral.susp 08/11/18 [Rx]








Follow up Appointment(s)/Referral(s): 


Vinay Alicia MD [Primary Care Provider] - 1-2 days

## 2018-08-11 NOTE — P.PCN
Date of Procedure: 08/11/18


Procedure(s) Performed: 


Procedure: Esophagogastroduodenoscopy.





Preoperative diagnosis: GI bleeding and anemia.





Postoperative diagnosis: 1. Hiatal hernia and distal esophageal ulcer not 

bleeding at the time of the exam most likely cause of bleeding and anemia.  2. 

No other abnormalities noted on the upper endoscopy.





Preparation and sedation: Was provided by anesthesia.





Brief clinical history: The patient is a 68-year-old female with past medical 

history of gastroesophageal reflux disease, hypertension and skin cancer on her 

cheek, who presented with history that she had lab work done with her PCP and 

then she received a call that her hemoglobin was low and was asked to come to 

the emergency center for evaluation.  Patient has had black tarry stools for 

the past 3-4 weeks.  Patient was found to have a hemoglobin of 5.6.  Vital 

signs were stable.  Patient was started on IV Protonix, consult placed with our 

service for possible EGD. She had a screening colonoscopy in January 2018 with 

Dr. Pena that showed diverticulosis.  Patient denied any dysphagia, 

odynophagia or any hematemesis or hematochezia.  No abdominal pain or weight 

loss.  The patient was transfused and her hemoglobin is stable at 7.6.  Other 

details are summarized in the history and physical and dictated consultations 

and progress notes.  This evaluation is to assess for a source of bleeding and 

anemia.





Procedure: With the patient on her left lateral decubitus position and after 

informed consent and adequate sedation, I passed a Olympus- video upper 

endoscope through the cricopharyngeus down the esophagus.  GE junction was 

around 34 cm from the incisors and there was a moderately sized hiatal hernia.  

The distal esophagus, close to the GE junction, showed a 1.5-2 cm triangular 

ulceration covered with white exudate with no evidence of active bleeding at 

the time of this exam.  No other pathology was noted in the esophagus such as 

esophageal varices, tumors or mucosal tears.  The endoscope was then passed to 

the rest of the stomach which was insufflated with air and inspected in detail 

including the retroflex view in the cardia.  Finally, the endoscope was passed 

to the pylorus into the duodenum.  Pyloric channel, duodenal bulb, was bulbar 

area and descending duodenum appeared within normal limits.  The stomach 

appeared normal with no ulcers, tumors or bleeding.  All secretions encountered 

during this exam were clear in color with no evidence of bleeding.  No biopsies 

were indicated then the endoscope was withdrawn.





The patient tolerated the procedure well.





Plan: The patient was reassured.  It is likely that her bleeding and anemia is 

originating in her esophagus secondary to reflux.  The patient was the taking 

acid suppressive therapy on an as-needed basis and this can be changed to daily 

until the ulcer heals.  Will monitor her blood counts as outpatient and further 

workup can be made based on her course and blood counts, including further 

workup of her small bowel and, if needed, repeat colon evaluation.

## 2018-11-09 ENCOUNTER — HOSPITAL ENCOUNTER (OUTPATIENT)
Dept: HOSPITAL 47 - ORWHC2ENDO | Age: 68
Discharge: HOME | End: 2018-11-09
Attending: SURGERY
Payer: MEDICARE

## 2018-11-09 VITALS — DIASTOLIC BLOOD PRESSURE: 78 MMHG | SYSTOLIC BLOOD PRESSURE: 163 MMHG | HEART RATE: 60 BPM

## 2018-11-09 VITALS — BODY MASS INDEX: 36.5 KG/M2

## 2018-11-09 VITALS — TEMPERATURE: 98.7 F | RESPIRATION RATE: 16 BRPM

## 2018-11-09 DIAGNOSIS — Z88.2: ICD-10-CM

## 2018-11-09 DIAGNOSIS — K44.9: ICD-10-CM

## 2018-11-09 DIAGNOSIS — K22.2: ICD-10-CM

## 2018-11-09 DIAGNOSIS — K29.50: Primary | ICD-10-CM

## 2018-11-09 DIAGNOSIS — K22.11: ICD-10-CM

## 2018-11-09 DIAGNOSIS — I10: ICD-10-CM

## 2018-11-09 DIAGNOSIS — Z79.899: ICD-10-CM

## 2018-11-09 DIAGNOSIS — Z85.820: ICD-10-CM

## 2018-11-09 DIAGNOSIS — K21.9: ICD-10-CM

## 2018-11-09 PROCEDURE — 43239 EGD BIOPSY SINGLE/MULTIPLE: CPT

## 2018-11-09 PROCEDURE — 88305 TISSUE EXAM BY PATHOLOGIST: CPT

## 2018-11-09 NOTE — P.PCN
Date of Procedure: 11/09/18


Procedure(s) Performed: 


Preoperative Dx: Upper GI bleed


Postoperative Dx: Mild gastritis, moderate size hiatal hernia


Procedure: EGD with Bx


Anesthesia: Sedation


Endoscopist: Dr. Pena


Specimens: Antrum


Endoscopic Procedure:   The patient was on the endoscopy table in the left 

decubitus position.  The Olympus gastroscope was inserted into the oropharynx 

and passed under direct visualization to the region of the third portion of the 

duodenum.  From that point the scope was slowly withdrawn inspecting all 

surfaces carefully.  There were no neoplastic inflammatory or polypoid lesions 

throughout the duodenum.  The pylorus was widely patent.  The stomach was 

carefully inspected.  There was mild gastritis present.  A biopsy of the antrum 

took place to rule out H. pylori.  Retroflexion revealed a moderate sized 

hiatal hernia.  The GE junction was present at 32 cm while the diaphragmatic 

hiatus was present at 38 cm.  The esophagus was then carefully examined.  There 

were no neoplastic inflammatory or polypoid lesions throughout the visualized 

esophagus.  There did appear to be slight narrowing at the GE junction 

consistent with an early Schatzki's ring.  The patient was then taken to the 

recovery room in stable condition per anesthesia guidelines.


Recommendations: Continue antiacids.  Await biopsy results.  If patient 

develops dysphagia symptoms consider esophageal dilation.

## 2019-10-24 NOTE — P.CONS
History of Present Illness





- Reason for Consult


Consult date: 08/10/18


GI bleeding and anemia for possible EGD





- History of Present Illness





The patient is a 68-year-old female with past medical history of 

gastroesophageal reflux disease, hypertension and skin cancer on her cheek, 

presented with history that she had lab work done with her PCP and then she 

received a call that her hemoglobin was low and was asked to come to the 

emergency center for evaluation.  Patient has had black tarry stools for the 

past 3-4 weeks.  Patient was found to have a hemoglobin of 5.6.  Vital signs 

were stable.  Patient was started on IV Protonix, consult placed with our 

service for possible EGD. She had a screening colonoscopy in 2018 with 

Dr. Pean that showed diverticulosis.  Patient denied any dysphagia, 

odynophagia or any hematemesis or hematochezia.  No abdominal pain or weight 

loss.  





Review of Systems





Constitutional: Denied fever, chills or unintentional weight loss


Neurologic: No headaches, double vision or other sensory or motor changes


Cardiopulmonary: No chest pains, shortness of breath or palpitations. History 

of HTN


Gastrointestinal: See present illness above


Genitourinary: No hematuria, dysuria or frequency


Musculoskeletal: No joint complaints or swelling. History of OA


Skin: No rashes


Endocrine: No history of diabetes or thyroid disease


Hematologic: No history of anemia or bleeding tendency


Psychiatric: No anxiety or depression





Past Medical History


Past Medical History: Cancer, GERD/Reflux, Hypertension, Osteoarthritis (OA)


Additional Past Medical History / Comment(s): Anemia, hiatal hernia, 

diverticulosis, basal cell skin cancer R cheek, nose and back-removed, 

arthritis bilateral knees, L clavicle fracture as a child.


History of Any Multi-Drug Resistant Organisms: None Reported


Past Surgical History: Cholecystectomy, Orthopedic Surgery, Tubal Ligation


Additional Past Surgical History / Comment(s): 1/10/18 colonoscopy and EGD with 

prior colonoscopies, R cheek skin cancer removal with graft, nose and back skin 

cancer removals, D&C, cold knife conization for precancer, R knee arthroscopy


Past Anesthesia/Blood Transfusion Reactions: No Reported Reaction


Smoking Status: Never smoker


Additional Past Alcohol Use History / Comment(s): Patient is a lifelong 

nonsmoker.  No illicit drug use.  Occasional alcohol use.  Patient was employed 

as a  at [employer] and is currently retired.





- Past Family History


  ** Mother


Family Medical History: CVA/TIA, Diabetes Mellitus


Additional Family Medical History / Comment(s): Mother  from a CVA at the 

age of 83 yrs.  She had NIDDM.





  ** Father


Family Medical History: Myocardial Infarction (MI)


Additional Family Medical History / Comment(s): Father  of a MI at the age 

of 60yrs.





  ** Brother(s)


Additional Family Medical History / Comment(s): Patient has total of 10 

siblings.  One brother  age 70 from coronary artery disease, one  at 

age 68 from alcohol complications.  One  due to a pedestrian motor vehicle 

accident.  One brother is alive with no major medical problems.





  ** Sister(s)


Additional Family Medical History / Comment(s): Patient has 1 sister that  

from lung cancer with history of smoking, one sister  from MS, one  

from female cancer, one  from an accident and one  as an infant.





Medications and Allergies


 Home Medications











 Medication  Instructions  Recorded  Confirmed  Type


 


Multivitamins, Thera [Multivitamin] 1 each PO DAILY 14 History


 


Aspirin EC [Ecotrin Low Dose] 81 mg PO DAILY 18 History


 


Losartan [Cozaar] 50 mg PO HS 18 History


 


Omeprazole 20 mg PO DAILY PRN 18 History


 


Vitamin C/Biotin [Hair, Skin and 1 each PO DAILY 18 History





Nails]    


 


Calcium Citrate/Vitamin D3 1 tab PO DAILY 18 History





[Calcitrate + Vit D Caplet]    


 


Prenatal Vit-Iron-Folic Acid 1 cap PO DAILY 18 History





[Prenatal-U Capsule (formulary)]    











 Allergies











Allergy/AdvReac Type Severity Reaction Status Date / Time


 


Sulfa (Sulfonamide AdvReac  felt like Verified 18 22:28





Antibiotics)   ants  





   crawling  





   all over  














Physical Exam


Vitals: 


 Vital Signs











  Temp Pulse Pulse Resp BP BP Pulse Ox


 


 08/10/18 15:28  98.4 F   75  18   105/62  99


 


 08/10/18 14:45  98.4 F  66   18  105/62   99


 


 08/10/18 13:20  97.9 F  69   18  112/66  


 


 08/10/18 12:50  98.4 F  87   18  115/65   100


 


 08/10/18 12:40  98.4 F  81   18  113/65   98


 


 08/10/18 12:00  98.4 F   82  18   113/65  98


 


 08/10/18 10:03  98 F  80   16  141/87   100


 


 08/10/18 06:40   80   18  104/36   100


 


 08/10/18 04:10  98.8 F  79   16  109/39   100


 


 08/10/18 04:05  97.9 F  84   18  109/39   100


 


 08/10/18 01:14  98.1 F  92   17  108/87   100


 


 08/10/18 00:44  98.3 F  87   20  123/51   100


 


 08/10/18 00:34  98.3 F  92   20  122/64   100


 


 08/10/18 00:31  98.9 F  85   20  102/52   100


 


 08/10/18 00:00   85   20  135/54   100


 


 18 23:00   82   20  128/53   100


 


 18 22:41   92   18  104/59   99


 


 18 22:29     18   


 


 18 21:31  98.3 F  90   20  126/55   100








 Intake and Output











 08/10/18 08/10/18 08/10/18





 06:59 14:59 22:59


 


Intake Total 310 0 310


 


Balance 310 0 310


 


Intake:   


 


  Blood Product 310 0 310


 


    Rc As-1  Unit 310  





    [id number]   


 


    Rc As-1  Unit  0 310





    [id number]   


 


Other:   


 


  Weight  92.986 kg 














General: Appears stated age, very pleasant in no acute distress


Head and neck: Normocephalic and atraumatic, conjunctivae pink and sclerae not 

icteric, mucous membranes moist and pink, no masses in the neck or tracheal 

shift


Lungs: Clear to auscultation with no dullness to percussion


Heart: Regular, no abnormal sounds, murmurs, gallops or friction rubs


Abdomen: Soft, no masses or organomegaly, no tenderness.  Bowel sounds present


Extremities: No clubbing, cyanosis or edema


Neurologic: Alert and oriented 3.  Cranial nerves grossly intact.  No gross 

sensory or motor abnormalities





Results


CBC & Chem 7: 


 18 05:46





 18 22:26


Labs: 


 Abnormal Lab Results - Last 24 Hours (Table)











  18 Range/Units





  22:26 22:26 22:26 


 


RBC  2.76 L    (3.80-5.40)  m/uL


 


Hgb  5.6 L*    (11.4-16.0)  gm/dL


 


Hct  19.7 L*    (34.0-46.0)  %


 


MCV  71.5 L    (80.0-100.0)  fL


 


MCH  20.3 L    (25.0-35.0)  pg


 


MCHC  28.4 L    (31.0-37.0)  g/dL


 


RDW  20.8 H    (11.5-15.5)  %


 


Neutrophils # (Manual)  8.55 H    (1.3-7.7)  k/uL


 


Lymphocytes # (Manual)  0.86 L    (1.0-4.8)  k/uL


 


APTT     (22.0-30.0)  sec


 


Chloride   109 H   ()  mmol/L


 


Carbon Dioxide   20 L   (22-30)  mmol/L


 


BUN   31 H   (7-17)  mg/dL


 


Creatinine   1.10 H   (0.52-1.04)  mg/dL


 


Glucose   112 H   (74-99)  mg/dL


 


Stool Occult Blood    Positive H  (Negative)  


 


Crossmatch     














  08/09/18 08/09/18 08/10/18 Range/Units





  22:26 22:33 04:56 


 


RBC    2.92 L  (3.80-5.40)  m/uL


 


Hgb    6.8 L*  (11.4-16.0)  gm/dL


 


Hct    22.7 L  (34.0-46.0)  %


 


MCV    78.0 L D  (80.0-100.0)  fL


 


MCH    23.1 L  (25.0-35.0)  pg


 


MCHC    29.7 L  (31.0-37.0)  g/dL


 


RDW    21.6 H  (11.5-15.5)  %


 


Neutrophils # (Manual)     (1.3-7.7)  k/uL


 


Lymphocytes # (Manual)     (1.0-4.8)  k/uL


 


APTT   19.8 L   (22.0-30.0)  sec


 


Chloride     ()  mmol/L


 


Carbon Dioxide     (22-30)  mmol/L


 


BUN     (7-17)  mg/dL


 


Creatinine     (0.52-1.04)  mg/dL


 


Glucose     (74-99)  mg/dL


 


Stool Occult Blood     (Negative)  


 


Crossmatch  See Detail    














  08/10/18 Range/Units





  16:21 


 


RBC  3.41 L  (3.80-5.40)  m/uL


 


Hgb  8.1 L  (11.4-16.0)  gm/dL


 


Hct  26.1 L  (34.0-46.0)  %


 


MCV  76.7 L  (80.0-100.0)  fL


 


MCH  23.7 L  (25.0-35.0)  pg


 


MCHC   (31.0-37.0)  g/dL


 


RDW  20.6 H  (11.5-15.5)  %


 


Neutrophils # (Manual)   (1.3-7.7)  k/uL


 


Lymphocytes # (Manual)   (1.0-4.8)  k/uL


 


APTT   (22.0-30.0)  sec


 


Chloride   ()  mmol/L


 


Carbon Dioxide   (22-30)  mmol/L


 


BUN   (7-17)  mg/dL


 


Creatinine   (0.52-1.04)  mg/dL


 


Glucose   (74-99)  mg/dL


 


Stool Occult Blood   (Negative)  


 


Crossmatch   














Assessment and Plan


Assessment: 





GI bleeding possibly related to peptic ulcer disease or complicated reflux.  

Other etiology including gastric ulcers or malignancy kept in mind in her age 

group.


Plan: 





We will proceed with upper endoscopy tomorrow.  Further plans based on her 

findings. [Today's Date] : [unfilled] [de-identified] : 09/24/2019 [FreeTextEntry1] : Normal Sinus Rhythm\par Normal Axis\par [id number] ms\par  [FreeTextEntry2] : Summary: 1. Normal study. 2. Normal left ventricular size, morphology and systolic function. 3. Bowing of the anterior leaflet of the mitral valve. 4. No pericardial effusion. 5. This was a technically difficult study in a anxious young man with pectus excavatum. [de-identified] : 10/08/2019 [de-identified] : The results of the 24-hour Holter monitor placed at last visit reviewed in detail today. The heart rate ranged from  beats per minute with an average of 88  beats per minute. The predominant rhythm was normal sinus rhythm alternating with sinus tachycardia and sinus arrhythmia. There was one supraventricular premature beat. There were no ventricular premature beats. There were no symptoms reported during the monitoring period.\par

## 2020-03-16 ENCOUNTER — HOSPITAL ENCOUNTER (OUTPATIENT)
Age: 70
Discharge: HOME | End: 2020-03-16
Payer: MEDICARE

## 2020-03-16 DIAGNOSIS — Z01.812: Primary | ICD-10-CM

## 2020-03-16 PROCEDURE — 87070 CULTURE OTHR SPECIMN AEROBIC: CPT

## 2020-06-30 ENCOUNTER — HOSPITAL ENCOUNTER (OUTPATIENT)
Dept: HOSPITAL 47 - LABPAT | Age: 70
Discharge: HOME | End: 2020-06-30
Attending: ORTHOPAEDIC SURGERY
Payer: MEDICARE

## 2020-06-30 DIAGNOSIS — Z01.812: Primary | ICD-10-CM

## 2020-06-30 PROCEDURE — 87070 CULTURE OTHR SPECIMN AEROBIC: CPT

## 2021-10-01 ENCOUNTER — HOSPITAL ENCOUNTER (OUTPATIENT)
Dept: HOSPITAL 47 - LABPAT | Age: 71
Discharge: HOME | End: 2021-10-01
Attending: ORTHOPAEDIC SURGERY
Payer: MEDICARE

## 2021-10-01 DIAGNOSIS — Z01.812: Primary | ICD-10-CM

## 2021-10-01 DIAGNOSIS — M17.11: ICD-10-CM

## 2021-10-01 PROCEDURE — 87070 CULTURE OTHR SPECIMN AEROBIC: CPT

## 2021-10-03 ENCOUNTER — HOSPITAL ENCOUNTER (EMERGENCY)
Dept: HOSPITAL 47 - EC | Age: 71
Discharge: HOME | End: 2021-10-03
Payer: MEDICARE

## 2021-10-03 VITALS
HEART RATE: 72 BPM | RESPIRATION RATE: 19 BRPM | TEMPERATURE: 98.5 F | SYSTOLIC BLOOD PRESSURE: 162 MMHG | DIASTOLIC BLOOD PRESSURE: 88 MMHG

## 2021-10-03 DIAGNOSIS — Z88.2: ICD-10-CM

## 2021-10-03 DIAGNOSIS — K21.9: ICD-10-CM

## 2021-10-03 DIAGNOSIS — I10: ICD-10-CM

## 2021-10-03 DIAGNOSIS — L03.114: Primary | ICD-10-CM

## 2021-10-03 DIAGNOSIS — Z79.899: ICD-10-CM

## 2021-10-03 PROCEDURE — 99282 EMERGENCY DEPT VISIT SF MDM: CPT

## 2021-10-03 NOTE — ED
General Adult HPI





- General


Chief complaint: Skin/Abscess/Foreign Body


Stated complaint: bee sting


Time Seen by Provider: 10/03/21 12:20


Source: patient, RN notes reviewed, old records reviewed


Mode of arrival: ambulatory


Limitations: no limitations





- History of Present Illness


Initial comments: 





This is a 71-year-old female presents emergency Department stating she was stung

by a bee in the left antecubital fossa.  Patient states it happened 2 weeks ago 

but ever since 2 years gotten red and she thinks the redness is getting a little

bit worse per patient states she has been putting some Neosporin on it and he 

continues to be itchy.  Patient denies any fever chills or cough.  Patient 

denies any streaking up the arm.  Patient denies any other problems.





- Related Data


                                Home Medications











 Medication  Instructions  Recorded  Confirmed


 


Multivitamins, Thera [Multivitamin] 1 each PO DAILY 14


 


Losartan [Cozaar] 50 mg PO HS 18


 


Vitamin C/Biotin [Hair, Skin and 1 each PO DAILY 18





Nails]   


 


Calcium Citrate/Vitamin D3 1 tab PO DAILY 18





[Calcitrate + Vit D Caplet]   


 


Pantoprazole Sodium [Protonix] 1 tab PO BID 18


 


Ferrous Sulfate [Feosol] 325 mg PO TID 18








                                  Previous Rx's











 Medication  Instructions  Recorded


 


Sucralfate [Carafate] 10 ml PO Q6H 30 Days #120 oral.susp 18


 


Cephalexin [Keflex] 500 mg PO Q6HR #20 cap 10/03/21











                                    Allergies











Allergy/AdvReac Type Severity Reaction Status Date / Time


 


Sulfa (Sulfonamide AdvReac  felt like Verified 10/03/21 12:20





Antibiotics)   ants  





   crawling  





   all over  














Review of Systems


ROS Statement: 


Those systems with pertinent positive or pertinent negative responses have been 

documented in the HPI.





ROS Other: All systems not noted in ROS Statement are negative.





Past Medical History


Past Medical History: Cancer, GERD/Reflux, Hypertension, Osteoarthritis (OA)


Additional Past Medical History / Comment(s): Anemia, hiatal hernia, 

diverticulosis, basal cell skin cancer R cheek, nose and back-removed, arthritis

 bilateral knees, L clavicle fracture as a child.


History of Any Multi-Drug Resistant Organisms: None Reported


Past Surgical History: Cholecystectomy, Orthopedic Surgery, Tubal Ligation


Additional Past Surgical History / Comment(s): 1/10/18 colonoscopy and EGD with 

prior colonoscopies, R cheek skin cancer removal with graft, nose and back skin 

cancer removals, D&C, cold knife conization for precancer, R knee arthroscopy


Past Anesthesia/Blood Transfusion Reactions: No Reported Reaction


Past Psychological History: No Psychological Hx Reported


Smoking Status: Never smoker


Past Alcohol Use History: Rare


Past Drug Use History: None Reported





- Past Family History


  ** Mother


Family Medical History: CVA/TIA, Diabetes Mellitus


Additional Family Medical History / Comment(s): Mother  from a CVA at the 

age of 83 yrs.  She had NIDDM.





  ** Father


Family Medical History: Myocardial Infarction (MI)


Additional Family Medical History / Comment(s): Father  of a MI at the age 

of 60yrs.





  ** Brother(s)


Additional Family Medical History / Comment(s): Patient has total of 10 

siblings.  One brother  age 70 from coronary artery disease, one  at age

 68 from alcohol complications.  One  due to a pedestrian motor vehicle 

accident.  One brother is alive with no major medical problems.





  ** Sister(s)


Family Medical History: Cancer


Additional Family Medical History / Comment(s): Patient has 1 sister that  

from lung cancer with history of smoking, one sister  from MS, one  from

 female cancer, one  from an accident and one  as an infant.





General Exam





- General Exam Comments


Initial Comments: 





GENERAL 


Patient is well-developed and well-nourished.  Patient is in mild distress.





EYES


Patient's pupils are equal and round.  Extraocular motion is intact





SKIN


Unremarkable





NEURO


The patient is alert and oriented 3





PYSCH


Patient has normal interpersonal interactions.





MUSCULOSKELETAL


Left antecubital fossa has a area of redness measuring about 3 cm in diameter no

 signs of any foreign body


Limitations: no limitations





Course


                                   Vital Signs











  10/03/21





  12:18


 


Temperature 98.5 F


 


Pulse Rate 72


 


Respiratory 19





Rate 


 


Blood Pressure 162/88


 


O2 Sat by Pulse 97





Oximetry 














Disposition


Clinical Impression: 


 Cellulitis of arm





Disposition: HOME SELF-CARE


Condition: Good


Instructions (If sedation given, give patient instructions):  Cellulitis (ED)


Additional Instructions: 


Patient should take Keflex as prescribed and stop using Neosporin.


Prescriptions: 


Cephalexin [Keflex] 500 mg PO Q6HR #20 cap


Is patient prescribed a controlled substance at d/c from ED?: No


Referrals: 


Seda Cooper MD [Primary Care Provider] - 1-2 days

## 2021-10-24 NOTE — HP
HISTORY AND PHYSICAL



DATE OF SURGERY:

10/25/2021



Melody Subramanian is a 71-year-old patient seen with symptomatic right knee

osteoarthritis.  After treatment options were discussed with her, she elected to

proceed with right total knee arthroplasty.  Consent was obtained.  Medical clearance

was provided by Dr. Cooper, cardiac clearance by Dr. Fountain.



PAST MEDICAL HISTORY:

Hypertension, hyperlipidemia, cardiovascular disease.



PAST SURGICAL HISTORY:

Cholecystectomy, D and C, knee arthroscopy.



DAILY MEDICATIONS:

Hydrochlorothiazide, losartan, pravastatin.



ALLERGIES:

SULFA.



SOCIAL HISTORY:

She denies tobacco use.



PHYSICAL EVALUATION OF THE RIGHT KNEE:

Her range of motion is negative 3 to 115.  There is a mild to moderate effusion.  There

is tenderness along the medial joint line, crepitus along the medial and patellofemoral

compartments with range of motion.  There is pain with patellofemoral compression.

Ligaments are stable.  Hip rotation is without pain.  Her distal neurovascular exam is

intact.



RADIOGRAPHS:

Right knee radiographs reveal severe osteoarthritic changes.



IMPRESSION:

1. Right knee osteoarthritis.

2. Hypertension.

3. Hyperlipidemia.

4. Gastroesophageal reflux disease.



PLAN:

Right total knee arthroplasty.





MMODL / IJN: [id number] / Job#: [id number]

## 2021-10-25 ENCOUNTER — HOSPITAL ENCOUNTER (OUTPATIENT)
Dept: HOSPITAL 47 - OR | Age: 71
Setting detail: OBSERVATION
LOS: 2 days | Discharge: HOME HEALTH | End: 2021-10-27
Attending: ORTHOPAEDIC SURGERY | Admitting: ORTHOPAEDIC SURGERY
Payer: MEDICARE

## 2021-10-25 VITALS — BODY MASS INDEX: 38.9 KG/M2

## 2021-10-25 DIAGNOSIS — Z88.2: ICD-10-CM

## 2021-10-25 DIAGNOSIS — E66.9: ICD-10-CM

## 2021-10-25 DIAGNOSIS — Z80.1: ICD-10-CM

## 2021-10-25 DIAGNOSIS — Z82.3: ICD-10-CM

## 2021-10-25 DIAGNOSIS — Z82.49: ICD-10-CM

## 2021-10-25 DIAGNOSIS — Z85.828: ICD-10-CM

## 2021-10-25 DIAGNOSIS — Z87.11: ICD-10-CM

## 2021-10-25 DIAGNOSIS — Z84.89: ICD-10-CM

## 2021-10-25 DIAGNOSIS — D62: ICD-10-CM

## 2021-10-25 DIAGNOSIS — I25.10: ICD-10-CM

## 2021-10-25 DIAGNOSIS — Z90.711: ICD-10-CM

## 2021-10-25 DIAGNOSIS — R06.02: ICD-10-CM

## 2021-10-25 DIAGNOSIS — I11.9: ICD-10-CM

## 2021-10-25 DIAGNOSIS — E78.5: ICD-10-CM

## 2021-10-25 DIAGNOSIS — M17.0: Primary | ICD-10-CM

## 2021-10-25 DIAGNOSIS — Z83.3: ICD-10-CM

## 2021-10-25 DIAGNOSIS — Z82.0: ICD-10-CM

## 2021-10-25 DIAGNOSIS — Z81.1: ICD-10-CM

## 2021-10-25 DIAGNOSIS — Z90.49: ICD-10-CM

## 2021-10-25 DIAGNOSIS — Z79.899: ICD-10-CM

## 2021-10-25 DIAGNOSIS — I35.1: ICD-10-CM

## 2021-10-25 DIAGNOSIS — Z20.822: ICD-10-CM

## 2021-10-25 DIAGNOSIS — K21.9: ICD-10-CM

## 2021-10-25 PROCEDURE — 87635 SARS-COV-2 COVID-19 AMP PRB: CPT

## 2021-10-25 PROCEDURE — 64999 UNLISTED PX NERVOUS SYSTEM: CPT

## 2021-10-25 PROCEDURE — 97161 PT EVAL LOW COMPLEX 20 MIN: CPT

## 2021-10-25 PROCEDURE — 73560 X-RAY EXAM OF KNEE 1 OR 2: CPT

## 2021-10-25 PROCEDURE — 64448 NJX AA&/STRD FEM NRV NFS IMG: CPT

## 2021-10-25 PROCEDURE — 80053 COMPREHEN METABOLIC PANEL: CPT

## 2021-10-25 PROCEDURE — 88300 SURGICAL PATH GROSS: CPT

## 2021-10-25 PROCEDURE — 76942 ECHO GUIDE FOR BIOPSY: CPT

## 2021-10-25 PROCEDURE — 85025 COMPLETE CBC W/AUTO DIFF WBC: CPT

## 2021-10-25 PROCEDURE — 97116 GAIT TRAINING THERAPY: CPT

## 2021-10-25 PROCEDURE — 94760 N-INVAS EAR/PLS OXIMETRY 1: CPT

## 2021-10-25 PROCEDURE — 27447 TOTAL KNEE ARTHROPLASTY: CPT

## 2021-10-25 RX ADMIN — HYDROMORPHONE HYDROCHLORIDE PRN MG: 1 INJECTION, SOLUTION INTRAMUSCULAR; INTRAVENOUS; SUBCUTANEOUS at 20:04

## 2021-10-25 RX ADMIN — PRAVASTATIN SODIUM SCH MG: 20 TABLET ORAL at 21:32

## 2021-10-25 RX ADMIN — DOCUSATE SODIUM AND SENNOSIDES SCH EACH: 50; 8.6 TABLET ORAL at 21:33

## 2021-10-25 RX ADMIN — Medication PRN MG: at 15:28

## 2021-10-25 RX ADMIN — POTASSIUM CHLORIDE SCH: 14.9 INJECTION, SOLUTION INTRAVENOUS at 21:34

## 2021-10-25 RX ADMIN — POTASSIUM CHLORIDE SCH MLS: 14.9 INJECTION, SOLUTION INTRAVENOUS at 11:30

## 2021-10-25 RX ADMIN — Medication PRN ML: at 20:00

## 2021-10-25 RX ADMIN — ENOXAPARIN SODIUM SCH MG: 30 INJECTION SUBCUTANEOUS at 21:33

## 2021-10-25 RX ADMIN — LOSARTAN POTASSIUM SCH MG: 50 TABLET, FILM COATED ORAL at 21:32

## 2021-10-25 RX ADMIN — Medication SCH MG: at 21:32

## 2021-10-25 NOTE — XR
EXAMINATION TYPE: XR knee limited RT

 

DATE OF EXAM: 10/25/2021

 

CLINICAL HISTORY: Postoperative evaluation

 

Two views of the right knee are submitted. 

 

 Identified are changes of total knee arthroplasty with femoral and tibial components appearing well 
seated.  Postsurgical soft tissue changes are noted.  Alignment is anatomic.

## 2021-10-25 NOTE — P.OP
Date of Procedure: 10/25/21


Preoperative Diagnosis: 


Right knee osteoarthritis


Postoperative Diagnosis: 


Right knee osteoarthritis


Procedure(s) Performed: 


Right total knee arthroplasty


Implants: 


1.  Depuy attune size 4 right cruciate retaining cemented femur


2.  Depuy attune size 3 fixed bearing cemented tibial baseplate


3.  Depuy attune size 4 fixed bearing retaining 8 mm polyethylene tibial surface


4.  Depuy attune 38 mm all polyethylene cemented patella





Anesthesia: regional (Abductor canal catheter, Ipack block), spinal


Surgeon: Werner Prajapati


Assistant #1: Jomar Perdomo


Estimated Blood Loss (ml): 47


Pathology: other (Bone)


Condition: stable


Disposition: PACU


Indications for Procedure: 


71-year-old patient seen with symptomatic right knee osteoarthritis.  After 

treatment options were discussed, she elected to proceed with total knee arth

roplasty.


Operative Findings: 


see description of procedure


Description of Procedure: 


Patient was taken to the operative suite after having an adductor canal catheter

placed by the department of anesthesia.  Patient underwent a spinal anesthetic 

by the department of anesthesia.  Patient was given preoperative IV intake 

antibiotics and TXA.  A well-padded tourniquet was placed about the right lower 

extremity.  The lower extremity was then prepped and draped in the normal 

sterile orthopedic fashion.  The extremity was elevated, a tourniquet was 

insufflated to 300.  A standard anterior incision was made sharply through skin.

 Dissection was taken down through the subcutaneous soft tissues down to the 

extensor mechanism.  A medial arthrotomy was performed, patella was everted and 

knee was flexed.  There was advanced osteoarthritis noted.  I introduced my 

distal intramedullary femoral drill.  I then introduced the distal femoral 

cutting jig.  Quan ARCE secured the cutting jig with 2 pins.  I held 

retractors in position while Quan ARCE performed the distal femoral 

resection through the guide area we now removed her distal femoral cutting 

guide.  We now placed our 4-in-1 femoral cutting block and positioned and it was

secured with 2 pins by Quan ARCE while I held the block in position.  The 

distal femoral finishing was now completed.  A proximal tibial cutting guide was

positioned.  I held the guide in the appropriate position with both hands well 

uQan ARCE inserted stabilizing pins into the guide.  Proximal tibial cut 

was made. We now placed a trial femoral component into position, along with an 

appropriate size tibial tray and insert.  We now took the knee through range of 

motion and had full extension good flexion and good overall soft tissue balance 

noted.  The patella was everted and stabilized with 2 towel clips held by Quan ARCE while I performed a flush with patellar quad tendon utilizing a fresh 

sawblade.  We templated the patella, appropriate drill holes were made.  An 

appropriate trial patella was positioned, knee was taken through full range of 

motion with the patella tracking very nicely.  The trial patella was removed.  

Drill holes were made through the femoral component.  All trial components were 

removed after marking off the appropriate rotation of the tibia.  Retractors we

re now positioned along the proximal tibia.  An appropriate keel punch was made 

with the appropriate size tibial guide by myself on Quan ARCE assisted by 

holding retractors.  At this point appropriate size implants were chosen and 

opened.  The joint was irrigated copiously with pulse lavage mechanical 

irrigation.  The posterior capsule was infiltrated with local analgesic.  The 

wound was irrigated with pulse lavage mechanical irrigation.  We mixed 

antibiotic methylmethacrylate.  We placed the knee into flexion.  We placed 

multiple retractors assisted by Quan ARCE to expose the proximal tibia.  

Once the methyl methacrylate was ready, the tibial component was cemented into 

place removing any excess methylmethacrylate form by both myself and Quan ARCE.  The femoral component was cemented into place removing the removing any 

excess methylmethacrylate performed by both myself and Quan ARCE.  We then 

inserted the appropriate size polyethylene tibial insert.  We made sure that it 

was locked into position.  We took the knee into full extension, and then back 

in a flexion making sure we had removed any excess methylmethacrylate.  The 

patellar component was then cemented down and secured with clamp.  Excess 

methylmethacrylate removed.  We kept the knee in full extension, patellar clamp 

in position until methylmethacrylate had hardened.  Once it had hardened the 

patellar clamp was removed.  The knee was taken through full range of motion.  

The patella tracked nicely.  There was good soft tissue balancing.  The 

tourniquet was now released.  Additional hemostasis was achieved via 

electrocautery.  A second gram of TXA was given.  The wound again was irrigated 

with pulse lavage mechanical irrigation.  The superficial soft tissues were 

infiltrated local analgesic.  The extensor mechanism was repaired with Ethibond.

 We checked the repair with range of motion and it was stable.  The subcutaneous

soft tissues were repaired with Vicryl in layers.  The skin was approximated 

with pernio/Dermabond.  Sterile dressings were applied followed by loose web 

roll and Ace bandage.  The patient was transferred to a bed, and taken to 

recovery in stable and satisfactory condition.  Quan ARCE assisted with 

this complex procedure.

## 2021-10-25 NOTE — P.ANPRN
Procedure Note - Anesthesia





- Nerve Block Performed


  ** Right Adductor Canal Infusion


Time Out Performed: Yes (1211)


Date of Procedure: 10/25/21


Procedure Start Time: 12:12


Procedure Stop Time: 12:17


Location of Patient: PreOp


Indication: Acute Post-Operative Pain, Requested by Surgeon


Specifically requested for management of pain by DrLauren: Werner Prajapati


Sedation Type: Sedate with meaningful contact maintained


Preparation: Sterile Prep, Sterile Dressing


Position: Supine


Catheter Depth at Skin (cm): 8


Catheter: Indwelling


Needle Types: Pajunk


Needle Gauge: 21


Ultrasound used to visualize needle placement: Yes


Ultrasound used to observe medication spread: Yes


Injectate: 0.5% Ropivacaine (see comment for volume) (15cc + nacl pf 5cc)


Blood Aspirated: No


Pain Paresthesia on Injection Noted: No


Resistance on Injection: Normal


Image Stored and Saved: Yes


Events: Uneventful and Well Tolerated





  ** Right iPack Single


Time Out Performed: Yes (1211)


Date of Procedure: 10/25/21


Procedure Start Time: 12:18


Procedure Stop Time: 12:23


Location of Patient: PreOp


Indication: Acute Post-Operative Pain, Requested by Surgeon


Specifically requested for management of pain by DrLauren: Werner Prajapati


Sedation Type: Sedate with meaningful contact maintained


Preparation: Sterile Prep


Position: Supine


Catheter: None


Needle Types: Pajunk


Needle Gauge: 21


Ultrasound used to visualize needle placement: Yes


Ultrasound used to observe medication spread: Yes


Injectate: 0.5% Ropivacaine (see comment for volume) (15cc + nacl pf 5cc)


Blood Aspirated: No


Pain Paresthesia on Injection Noted: No


Resistance on Injection: Normal


Image Stored and Saved: Yes


Events: Uneventful and Well Tolerated

## 2021-10-26 LAB
ALBUMIN SERPL-MCNC: 4 G/DL (ref 3.8–4.9)
ALBUMIN/GLOB SERPL: 1.73 G/DL (ref 1.6–3.17)
ALP SERPL-CCNC: 67 U/L (ref 41–126)
ALT SERPL-CCNC: 30 U/L (ref 8–44)
ANION GAP SERPL CALC-SCNC: 12.2 MMOL/L (ref 4–12)
AST SERPL-CCNC: 32 U/L (ref 13–35)
BASOPHILS # BLD AUTO: 0.01 X 10*3/UL (ref 0–0.1)
BASOPHILS NFR BLD AUTO: 0.1 %
BUN SERPL-SCNC: 18.2 MG/DL (ref 9–27)
BUN/CREAT SERPL: 14.44 RATIO (ref 12–20)
CALCIUM SPEC-MCNC: 9.2 MG/DL (ref 8.7–10.3)
CHLORIDE SERPL-SCNC: 103 MMOL/L (ref 96–109)
CO2 SERPL-SCNC: 20.4 MMOL/L (ref 21.6–31.8)
EOSINOPHIL # BLD AUTO: 0 X 10*3/UL (ref 0.04–0.35)
EOSINOPHIL NFR BLD AUTO: 0 %
ERYTHROCYTE [DISTWIDTH] IN BLOOD BY AUTOMATED COUNT: 4.48 X 10*6/UL (ref 4.1–5.2)
ERYTHROCYTE [DISTWIDTH] IN BLOOD: 13.4 % (ref 11.5–14.5)
GLOBULIN SER CALC-MCNC: 2.3 G/DL (ref 1.6–3.3)
GLUCOSE SERPL-MCNC: 112 MG/DL (ref 70–110)
HCT VFR BLD AUTO: 43.3 % (ref 37.2–46.3)
HGB BLD-MCNC: 14.2 G/DL (ref 12–15)
LYMPHOCYTES # SPEC AUTO: 0.9 X 10*3/UL (ref 0.9–5)
LYMPHOCYTES NFR SPEC AUTO: 7.5 %
MCH RBC QN AUTO: 31.7 PG (ref 27–32)
MCHC RBC AUTO-ENTMCNC: 32.8 G/DL (ref 32–37)
MCV RBC AUTO: 96.7 FL (ref 80–97)
MONOCYTES # BLD AUTO: 0.67 X 10*3/UL (ref 0.2–1)
MONOCYTES NFR BLD AUTO: 5.6 %
NEUTROPHILS # BLD AUTO: 10.31 X 10*3/UL (ref 1.8–7.7)
NEUTROPHILS NFR BLD AUTO: 86.5 %
PLATELET # BLD AUTO: 276 X 10*3/UL (ref 140–440)
POTASSIUM SERPL-SCNC: 4.5 MMOL/L (ref 3.5–5.5)
PROT SERPL-MCNC: 6.2 G/DL (ref 6.2–8.2)
SODIUM SERPL-SCNC: 135 MMOL/L (ref 135–145)
WBC # BLD AUTO: 11.93 X 10*3/UL (ref 4.5–10)

## 2021-10-26 RX ADMIN — POTASSIUM CHLORIDE SCH: 14.9 INJECTION, SOLUTION INTRAVENOUS at 05:57

## 2021-10-26 RX ADMIN — HYDROMORPHONE HYDROCHLORIDE PRN MG: 1 INJECTION, SOLUTION INTRAMUSCULAR; INTRAVENOUS; SUBCUTANEOUS at 04:45

## 2021-10-26 RX ADMIN — Medication SCH MG: at 20:52

## 2021-10-26 RX ADMIN — THERA TABS SCH EACH: TAB at 10:10

## 2021-10-26 RX ADMIN — PANTOPRAZOLE SODIUM SCH MG: 40 TABLET, DELAYED RELEASE ORAL at 10:12

## 2021-10-26 RX ADMIN — HYDROMORPHONE HYDROCHLORIDE PRN MG: 1 INJECTION, SOLUTION INTRAMUSCULAR; INTRAVENOUS; SUBCUTANEOUS at 10:44

## 2021-10-26 RX ADMIN — POTASSIUM CHLORIDE SCH MLS/HR: 14.9 INJECTION, SOLUTION INTRAVENOUS at 10:12

## 2021-10-26 RX ADMIN — ENOXAPARIN SODIUM SCH MG: 30 INJECTION SUBCUTANEOUS at 10:12

## 2021-10-26 RX ADMIN — HYDROCODONE BITARTRATE AND ACETAMINOPHEN PRN EACH: 5; 325 TABLET ORAL at 16:17

## 2021-10-26 RX ADMIN — Medication SCH MG: at 16:13

## 2021-10-26 RX ADMIN — LOSARTAN POTASSIUM SCH MG: 50 TABLET, FILM COATED ORAL at 20:52

## 2021-10-26 RX ADMIN — DOCUSATE SODIUM AND SENNOSIDES SCH EACH: 50; 8.6 TABLET ORAL at 20:52

## 2021-10-26 RX ADMIN — Medication SCH EACH: at 10:10

## 2021-10-26 RX ADMIN — POTASSIUM CHLORIDE SCH: 14.9 INJECTION, SOLUTION INTRAVENOUS at 16:11

## 2021-10-26 RX ADMIN — MELOXICAM SCH MG: 7.5 TABLET ORAL at 10:10

## 2021-10-26 RX ADMIN — HYDROCODONE BITARTRATE AND ACETAMINOPHEN PRN EACH: 5; 325 TABLET ORAL at 22:28

## 2021-10-26 RX ADMIN — Medication SCH MG: at 10:10

## 2021-10-26 RX ADMIN — PRAVASTATIN SODIUM SCH MG: 20 TABLET ORAL at 20:52

## 2021-10-26 RX ADMIN — ENOXAPARIN SODIUM SCH MG: 30 INJECTION SUBCUTANEOUS at 20:52

## 2021-10-26 NOTE — P.CONS
History of Present Illness





- Reason for Consult


Consult date: 10/25/21


Medical management


Requesting physician: Werner Prajapati





- Chief Complaint


S/P Right Total knee Arthroplasty





- History of Present Illness





HISTORY OF PRESENT ILLNESS:





This is a 71-year-old  female one of my patient with a previous medical

history significant for hypertension and hypertensive cardio vascular disease 

hyperlipidemia, GERD, history of basal cell carcinoma status post resection, 

osteoarthritis of the right knee underwent right total knee arthroplasty that 

was done successfully by Dr. Prajapati and we were asked to see the patient 

postoperatively for medical management, patient was seen in the recovery phase 2

and she was doing better her blood pressure was a bit elevated, patient did not 

receive any of her hypertension medications, she did receive hydralazine 10 mg 

IV push in the recovery room in her blood pressures around 169/77 I started 

nursing staff to give her the hydrochlorothiazide as well as losartan tonight.  

And monitor the patient blood pressure very closely.  





REVIEW OF SYSTEMS:





Constitutional: No documented fever, no chills, no night sweats.  No weight 

change.  No weakness, fatigue or lethargy.  No daytime sleepiness.


HEENT: No headache.  No blurred vision or double vision, no loss of vision.  No 

loss of Hearing, no ringing in the ears, no dizziness.  No nasal drainage or 

congestion.  No epistaxis.  No sore throat.


Lungs: No shortness of breath, no cough, no sputum production.  No wheezing.  

Reports dyspnea with activity.


Cardiovascular: No chest pain, no lower extremity edema.  No palpitations.  No 

paroxysmal nocturnal dyspnea.  No orthopnea.  No lightheadedness or dizziness.  

No syncopal episodes.


Abdominal: Reports no abdominal pain.  No nausea, vomiting.  No diarrhea.  No 

constipation.  No bloody or tarry stools reports loss of appetite.


Genitourinary: No dysuria, increased frequency, urgency.  No urinary retention.


Musculoskeletal: No myalgias.  No muscle weakness, no gait dysfunction, no 

frequent falls.  No back pain.  No neck pain. right knee pain and pressure. 


Integumentary: No wounds, no lesions.  No rash or pruritus.  No unusual 

bruising.  No change in hair or nails.


Neurologic: No aphasia. No facial droop. No change in mentation. No head injury.

No headache. No paralysis. No paresthesia.


Psychiatric: No depression.  No anxiety.  No mood swings.


Endocrine: No abnormal blood sugars.  No weight change.  





PAST MEDICAL HISTORY:





Hypertension and hypertensive cardiovascular disease.


Hyperlipidemia.


GERD.


BCC





PAST SURGICAL HISTORY:





 cholecystectomy  


 D&C.


Tubal ligation .


Colonoscopy 2018


Basal cell carcinoma removal.


Partial hysterectomy cervix removed only .





SOCIAL HISTORY:





 patient is a lifelong nonsmoker, she denies any alcohol ingestion, she denies 

any drug use or abuse.  





FAMILY HISTORY: 





 father  at age 61 from MI, mother  at age of 83 for CVA and she had a 

history of diabetes mellitus type 2.  Patient had 4 brothers one still alive one

had heart attack at the age of 73 one is alcoholic and had heart disease at the 

age of 68 , 1 hit by a car the age of 3 year, patient had 6 sisters all were 

 1 from Parkinson 1 from MS 1 from one sister with lung cancer.  One 

sister with diabetes and cancer, one sister  from a bike injury and 

brain injury, patient has one son and one daughter who are healthy.  





PHYSICAL EXAMINATION:





General:  71-year-old  female laying down in bed in no apparent 

distress.  


HEENT: Head is atraumatic, normocephalic, pupils were equal round reactive to 

light and recommendation, extraocular muscle movement were intact, sclera 

nonicteric, conjunctivae were pale, mucous membranes of the mouth are somewhat 

dry.


Neck: Supple, no JVP, normal carotid upstroke bilaterally, no lymphadenopathy.


Chest: Decreased breath sounds at the bases, few rhonchi, no extremity wheezes, 

no chest wall tenderness, no intercostal retractions.


Heart: First heart sound is normal, second heart sounds normal I could not 

appreciate any gallop or murmur.  


Abdomen: Soft, nontender, nondistended, positive bowel sounds, there is no 

hepatosplenomegaly.  


Extremities: There is no edema no calf tenderness DP +2 bilaterally, right lower

extremity is wrapped with an Ace wrap there is on Q pump in place 


Neurologic examination: Patient is awake alert and oriented X 3, cranial nerves 

II-12 appear grossly intact, muscle power were 5 out of 5 in upper extremities 

and 5 out of 5 in bilateral lower extremities, deep tendon reflexes normal 

bilaterally.





ASSESSMENT AND PLAN:





1.  Postoperative day #0 status post right total knee arthroplasty.  Patient was

instructed to use incentive spirometer to reduce the incidence of atelectasis 

and healthcare associated pneumonia, continue current pain management as 

outlined by orthopedic surgery, patient will be started on Lovenox 30 mg 

subcutaneous every 12 hours, physical therapy evaluation, likely will be 

discharged home in the next day or so .





2.  Hypertension and hypertensive cardiovascular disease.  Continue patient on 

hydrochlorothiazide 25 mg orally once every day, continue patient on losartan 50

mg orally once every day.  Monitor the patient blood pressure very closely may 

use hydralazine 10 mg IV push every 4 hours as needed for systolic greater than 

or equal to 160 mmHg. 





3.  Hyperlipidemia.  Continue pravastatin 20 mg at bedtime. 





4.  Acute blood loss anemia expected outcome of surgery.  Continue iron to 25 mg

orally 3 times every day, monitor the patient's CBC the next 24 hours.





5.  Osteoarthritis.  Continue patient on meloxicam 7.5 mg orally once every day.





6.  DVT prophylaxis.  Continue Lovenox 30 mg subcutaneously every 12 hours.





7.  GI prophylaxis.  Continue Protonix 40 mg orally once every day.





8.  Thank you Dr. Dr. Prajapati for allowing me to participate in the care of 

your patient we will follow along with you.





Past Medical History


Past Medical History: Cancer, GERD/Reflux, Hyperlipidemia, Hypertension, 

Osteoarthritis (OA)


Additional Past Medical History / Comment(s): Anemia, hiatal hernia, 

diverticulosis, basal cell skin cancer R cheek, nose and back-removed, arthritis

bilateral knees, L clavicle fracture as a child. SEES DR. MCCOLLUM EVERY 6 MONTHS 

FOR LEAKY VALVE


History of Any Multi-Drug Resistant Organisms: None Reported


Past Surgical History: Cholecystectomy, Orthopedic Surgery, Tubal Ligation


Additional Past Surgical History / Comment(s): 1/10/18 colonoscopy and EGD with 

prior colonoscopies, R cheek skin cancer removal with graft, nose and back skin 

cancer removals, D&C, cold knife conization for precancer, R knee arthroscopy


Past Anesthesia/Blood Transfusion Reactions: No Reported Reaction


Smoking Status: Never smoker





- Past Family History


  ** Mother


Family Medical History: CVA/TIA, Diabetes Mellitus


Additional Family Medical History / Comment(s): Mother  from a CVA at the 

age of 83 yrs.  She had NIDDM.





  ** Father


Family Medical History: Myocardial Infarction (MI)


Additional Family Medical History / Comment(s): Father  of a MI at the age 

of 60yrs.





  ** Brother(s)


Additional Family Medical History / Comment(s): Patient has total of 10 

siblings.  One brother  age 70 from coronary artery disease, one  at age

68 from alcohol complications.  One  due to a pedestrian motor vehicle 

accident.  One brother is alive with no major medical problems.





  ** Sister(s)


Family Medical History: Cancer


Additional Family Medical History / Comment(s): Patient has 1 sister that  

from lung cancer with history of smoking, one sister  from MS, one  from

female cancer, one  from an accident and one  as an infant.





Medications and Allergies


                                Home Medications











 Medication  Instructions  Recorded  Confirmed  Type


 


Multivitamins, Thera [Multivitamin] 1 each PO DAILY 07/21/14 10/25/21 History


 


Losartan [Cozaar] 50 mg PO HS 01/05/18 10/25/21 History


 


Vitamin C/Biotin [Hair, Skin and 1 each PO DAILY 01/05/18 10/25/21 History





Nails]    


 


Calcium Citrate/Vitamin D3 1 tab PO DAILY 08/09/18 10/25/21 History





[Calcitrate + Vit D Caplet]    


 


Pantoprazole Sodium [Protonix] 1 tab PO DAILY 08/11/18 10/25/21 History


 


Ferrous Sulfate [Feosol] 325 mg PO TID 11/07/18 10/25/21 History


 


Pravastatin Sodium [Pravachol] 20 mg PO HS 10/25/21 10/25/21 History


 


hydroCHLOROthiazide 25 mg PO DAILY 10/25/21 10/25/21 History








                                    Allergies











Allergy/AdvReac Type Severity Reaction Status Date / Time


 


Sulfa (Sulfonamide AdvReac  felt like Verified 10/21/21 14:35





Antibiotics)   ants  





   crawling  





   all over  














Physical Exam


Vitals: 


                                   Vital Signs











  Temp Pulse Resp BP BP Pulse Ox


 


 10/25/21 18:26   87  14   161/73  100


 


 10/25/21 18:00   80  14   142/69  100


 


 10/25/21 17:45   78  14   145/67  100


 


 10/25/21 17:00   76  18   129/58  93 L


 


 10/25/21 16:00   72  16   136/66  100


 


 10/25/21 15:30   76  14   143/65  100


 


 10/25/21 15:15   73  14   189/86  99


 


 10/25/21 15:00   66  14   200/86  99


 


 10/25/21 14:49   63  14   212/97  100


 


 10/25/21 14:35  96.9 F L  68  16  146/65   96


 


 10/25/21 12:24   66  16  141/66   98


 


 10/25/21 11:18  97.2 F L  75  16  174/72   96








                                Intake and Output











 10/25/21 10/25/21 10/25/21





 06:59 14:59 22:59


 


Intake Total  1151 


 


Output Total  47 


 


Balance  1104 


 


Intake:   


 


  IV  1151 


 


Output:   


 


  Estimated Blood Loss  47 


 


Other:   


 


  Weight  99.6 kg

## 2021-10-26 NOTE — P.PN
Subjective


Progress Note Date: 10/26/21


Principal diagnosis: 





Status post right total knee arthroplasty





Patient was evaluated today bedside, she is resting in her hospital chair. She 

did not yesterday when getting up on a pursestring occasions, she had a very 

difficult time.  This has improved she states, she did well with physical 

therapy.  Currently pain control is adequate with current medications.  She 

denies any headaches, lightheadedness, chest pain or shortness of breath. 





Objective





- Vital Signs


Vital signs: 


                                   Vital Signs











Temp  98.3 F   10/26/21 08:44


 


Pulse  81   10/26/21 08:44


 


Resp  15   10/26/21 00:58


 


BP  126/60   10/26/21 08:44


 


Pulse Ox  91 L  10/26/21 08:44








                                 Intake & Output











 10/25/21 10/26/21 10/26/21





 18:59 06:59 18:59


 


Intake Total 1151  


 


Output Total 47 200 


 


Balance 1104 -200 


 


Weight 99.6 kg 99.6 kg 


 


Intake:   


 


  IV 1151  


 


Output:   


 


  Urine  200 


 


  Estimated Blood Loss 47  


 


Other:   


 


  Voiding Method   Toilet














- Exam





Right lower extremity:





Incision is clean, dry, and intact.  The foam dressing is in good condition.  

There is minimal soft tissue swelling and ecchymosis surrounding the medial and 

lateral aspects of the incision.  Calf is soft, no tenderness with palpation.  

Plantar flexion, dorsiflexion, EHL, FHL are intact.  Sensory exam to light touch

throughout the extremity is intact, dorsal pedis pulses 2+.








- Labs


CBC & Chem 7: 


                                 10/26/21 06:22





                                 10/26/21 06:22


Labs: 


                  Abnormal Lab Results - Last 24 Hours (Table)











  10/26/21 10/26/21 Range/Units





  06:22 06:22 


 


WBC  11.93 H   (4.50-10.00)  X 10*3/uL


 


Neutrophils #  10.31 H   (1.80-7.70)  X 10*3/uL


 


Eosinophils #  0 L   (0.04-0.35)  X 10*3/uL


 


Carbon Dioxide   20.4 L  (21.6-31.8)  mmol/L


 


Anion Gap   12.20 H  (4.00-12.00)  mmol/L


 


Est GFR (CKD-EPI)AfAm   49.0 L  (60.0-200.0)   


 


Est GFR (CKD-EPI)NonAf   42.2 L  (60.0-200.0)   


 


Glucose   112 H  ()  mg/dL














Assessment and Plan


Assessment: 





Postoperative day #1 status post right total knee arthroplasty


Plan: 





Pain control, will continue current medications at this time





GI and DVT prophylaxis, continue subcu Lovenox, we'll likely transition to 

aspirin 81 mg twice a day at discharge





Encourage incentive spirometer





Plan for home physical therapy and nursing of discharge





Medical recommendations





Icing and elevating techniques were discussed





Wound care instructions were discussed, this including showering





Discharge planning: Depending on how patient does later today with ambulating, 

may discharge home, if not plan for discharge on 10/27/2021


Time with Patient: Less than 30

## 2021-10-26 NOTE — P.PN
Progress Note - Text


Progress Note Date: 10/26/21


Patient was seen and evaluated at bedside.  Patient was sitting comfortably in 

her bed.   Status post postoperative day 1 for right total knee arthroplasty 

patient had adductor canal catheter for postop pain control.  Patient rated  

pain at rest 4 -5 out of 10 in severity.  Patient describes  pain is aching, 

throbbing type on the sides of the  knee and back of the knee.  With activity 

patient rated pain levels 6-7 out of 10 in severity . With the help of oral pain

medications patient pain levels are tolerable.   Patient denied any weakness/ 

numbness in his lower extremities. patient denied any fever, pain over the 

catheter site.





Physical exam: 


Patient vital signs stable


Patient is alert awake oriented 3 responding to all questions appropriately


Examination of the catheter site showed  dressing intact,  no leaking fluid 

around the catheter, no redness, no tenderness over. The catheter insertion 

area.





plan:





status post postoperative day 1 for right total knee arthroplasty with adductor 

canal catheter for pain control. Patient was discussed to continue the 

medication  at the rate of 8 mL per hour until the pump is completely empty, and

instructed the patient how to discontinue the catheter.

## 2021-10-27 VITALS — HEART RATE: 82 BPM | TEMPERATURE: 98.4 F | DIASTOLIC BLOOD PRESSURE: 78 MMHG | SYSTOLIC BLOOD PRESSURE: 171 MMHG

## 2021-10-27 VITALS — RESPIRATION RATE: 17 BRPM

## 2021-10-27 RX ADMIN — POTASSIUM CHLORIDE SCH: 14.9 INJECTION, SOLUTION INTRAVENOUS at 08:00

## 2021-10-27 RX ADMIN — Medication SCH MG: at 07:56

## 2021-10-27 RX ADMIN — Medication SCH EACH: at 07:56

## 2021-10-27 RX ADMIN — MELOXICAM SCH MG: 7.5 TABLET ORAL at 07:56

## 2021-10-27 RX ADMIN — THERA TABS SCH EACH: TAB at 07:56

## 2021-10-27 RX ADMIN — PANTOPRAZOLE SODIUM SCH MG: 40 TABLET, DELAYED RELEASE ORAL at 07:55

## 2021-10-27 RX ADMIN — ENOXAPARIN SODIUM SCH MG: 30 INJECTION SUBCUTANEOUS at 07:58

## 2021-10-27 RX ADMIN — HYDROCODONE BITARTRATE AND ACETAMINOPHEN PRN EACH: 5; 325 TABLET ORAL at 07:54

## 2021-10-27 NOTE — P.PN
Subjective


Progress Note Date: 10/27/21


Principal diagnosis: 





Status post right total knee arthroplasty





Patient was evaluated today bedside, she is resting in her hospital chair. 

Currently pain control is adequate with current medications.  She denies any 

headaches, lightheadedness, chest pain or shortness of breath. 





Objective





- Vital Signs


Vital signs: 


                                   Vital Signs











Temp  98.4 F   10/27/21 07:28


 


Pulse  82   10/27/21 07:28


 


Resp  17   10/27/21 07:28


 


BP  171/78   10/27/21 07:28


 


Pulse Ox  98   10/27/21 07:47








                                 Intake & Output











 10/26/21 10/27/21 10/27/21





 18:59 06:59 18:59


 


Other:   


 


  Voiding Method Toilet Toilet 


 


  # Voids 3 3 


 


  # Bowel Movements  0 














- Exam





Right lower extremity:





Incision is clean, dry, and intact.  The foam dressing is in good condition.  

There is minimal soft tissue swelling and ecchymosis surrounding the medial and 

lateral aspects of the incision.  Calf is soft, no tenderness with palpation.  

Plantar flexion, dorsiflexion, EHL, FHL are intact.  Sensory exam to light touch

throughout the extremity is intact, dorsal pedis pulses 2+.








- Labs


CBC & Chem 7: 


                                 10/26/21 06:22





                                 10/26/21 06:22


Labs: 


                  Abnormal Lab Results - Last 24 Hours (Table)











  10/26/21 10/26/21 Range/Units





  06:22 06:22 


 


WBC  11.93 H   (4.50-10.00)  X 10*3/uL


 


Neutrophils #  10.31 H   (1.80-7.70)  X 10*3/uL


 


Eosinophils #  0 L   (0.04-0.35)  X 10*3/uL


 


Carbon Dioxide   20.4 L  (21.6-31.8)  mmol/L


 


Anion Gap   12.20 H  (4.00-12.00)  mmol/L


 


Est GFR (CKD-EPI)AfAm   49.0 L  (60.0-200.0)   


 


Est GFR (CKD-EPI)NonAf   42.2 L  (60.0-200.0)   


 


Glucose   112 H  ()  mg/dL














Assessment and Plan


Assessment: 





Postoperative day #2 status post right total knee arthroplasty


Plan: 





Pain control, plan for discharge with Norco 5 mg/325 mg





GI and DVT prophylaxis, aspirin 81 mg twice a day for 30 days





Encourage incentive spirometer





Plan for home physical therapy and nursing of discharge





Medical recommendations





Icing and elevating techniques were discussed





Wound care instructions were discussed, this including showering





Discharge planning: Plan for discharge home today


Time with Patient: Less than 30

## 2021-10-27 NOTE — P.PN
Subjective


Progress Note Date: 10/26/21


Progress note 10/26/2021








HISTORY OF PRESENT ILLNESS:





This is a 71-year-old  female one of my patient with a previous medical

history significant for hypertension and hypertensive cardio vascular disease 

hyperlipidemia, GERD, history of basal cell carcinoma status post resection, 

osteoarthritis of the right knee underwent right total knee arthroplasty that 

was done successfully by Dr. Prajapati and we were asked to see the patient 

postoperatively for medical management, patient was seen in the recovery phase 2

and she was doing better her blood pressure was a bit elevated, patient did not 

receive any of her hypertension medications, she did receive hydralazine 10 mg 

IV push in the recovery room in her blood pressures around 169/77 I started 

nursing staff to give her the hydrochlorothiazide as well as losartan tonight.  

And monitor the patient blood pressure very closely.  





10/26: Patient is laying down in bed is doing a lot better, she denies any chest

pain, less short of breath, she continues to have some pain in the right knee, 

she elected to see in the hospital for another 24 hours, she will likely be 

medically stable to be discharged tomorrow morning.





REVIEW OF SYSTEMS:





Constitutional: No documented fever, no chills, no night sweats.  No weight 

change.  No weakness, fatigue or lethargy.  No daytime sleepiness.


HEENT: No headache.  No blurred vision or double vision, no loss of vision.  No 

loss of Hearing, no ringing in the ears, no dizziness.  No nasal drainage or 

congestion.  No epistaxis.  No sore throat.


Lungs: No shortness of breath, no cough, no sputum production.  No wheezing.  

Reports dyspnea with activity.


Cardiovascular: No chest pain, no lower extremity edema.  No palpitations.  No 

paroxysmal nocturnal dyspnea.  No orthopnea.  No lightheadedness or dizziness.  

No syncopal episodes.


Abdominal: Reports no abdominal pain.  No nausea, vomiting.  No diarrhea.  No 

constipation.  No bloody or tarry stools reports loss of appetite.


Genitourinary: No dysuria, increased frequency, urgency.  No urinary retention.


Musculoskeletal: No myalgias.  No muscle weakness, no gait dysfunction, no 

frequent falls.  No back pain.  No neck pain. right knee pain and pressure. 


Integumentary: No wounds, no lesions.  No rash or pruritus.  No unusual 

bruising.  No change in hair or nails.


Neurologic: No aphasia. No facial droop. No change in mentation. No head injury.

No headache. No paralysis. No paresthesia.


Psychiatric: No depression.  No anxiety.  No mood swings.


Endocrine: No abnormal blood sugars.  No weight change.  








PHYSICAL EXAMINATION:





General:  71-year-old  female laying down in bed in no apparent 

distress.  


HEENT: Head is atraumatic, normocephalic, pupils were equal round reactive to 

light and recommendation, extraocular muscle movement were intact, sclera 

nonicteric, conjunctivae were pale, mucous membranes of the mouth are somewhat 

dry.


Neck: Supple, no JVP, normal carotid upstroke bilaterally, no lymphadenopathy.


Chest: Decreased breath sounds at the bases, few rhonchi, no extremity wheezes, 

no chest wall tenderness, no intercostal retractions.


Heart: First heart sound is normal, second heart sounds normal I could not appr

eciate any gallop or murmur.  


Abdomen: Soft, nontender, nondistended, positive bowel sounds, there is no 

hepatosplenomegaly.  


Extremities: There is no edema no calf tenderness DP +2 bilaterally, right lower

extremity is wrapped with an Ace wrap there is on Q pump in place 


Neurologic examination: Patient is awake alert and oriented X 3, cranial nerves 

II-12 appear grossly intact, muscle power were 5 out of 5 in upper extremities 

and 5 out of 5 in bilateral lower extremities, deep tendon reflexes normal 

bilaterally.





ASSESSMENT AND PLAN:





1.  Postoperative day #1 status post right total knee arthroplasty.  Patient was

instructed to use incentive spirometer to reduce the incidence of atelectasis 

and healthcare associated pneumonia, continue current pain management as 

outlined by orthopedic surgery, patient will be started on Lovenox 30 mg 

subcutaneous every 12 hours, physical therapy evaluation, likely will be 

discharged home in the next day or so .





2.  Hypertension and hypertensive cardiovascular disease.  Continue patient on 

hydrochlorothiazide 25 mg orally once every day, continue patient on losartan 50

mg orally once every day.  Monitor the patient blood pressure very closely may 

use hydralazine 10 mg IV push every 4 hours as needed for systolic greater than 

or equal to 160 mmHg. 





3.  Hyperlipidemia.  Continue pravastatin 20 mg at bedtime. 





4.  Acute blood loss anemia expected outcome of surgery.  Continue iron to 25 mg

orally 3 times every day, monitor the patient's CBC the next 24 hours.





5.  Osteoarthritis.  Continue patient on meloxicam 7.5 mg orally once every day.





6.  DVT prophylaxis.  Continue Lovenox 30 mg subcutaneously every 12 hours.





7.  GI prophylaxis.  Continue Protonix 40 mg orally once every day.





8. Medically stable for discharge in AM.





Objective





- Vital Signs


Vital signs: 


                                   Vital Signs











Temp  98.1 F   10/26/21 00:58


 


Pulse  86   10/26/21 00:58


 


Resp  15   10/26/21 00:58


 


BP  121/69   10/26/21 00:58


 


Pulse Ox  95   10/26/21 00:58








                                 Intake & Output











 10/25/21 10/25/21 10/26/21





 06:59 18:59 06:59


 


Intake Total  1151 


 


Output Total  47 200


 


Balance  1104 -200


 


Weight  99.6 kg 99.6 kg


 


Intake:   


 


  IV  1151 


 


Output:   


 


  Urine   200


 


  Estimated Blood Loss  47 














- Labs


CBC & Chem 7: 


                                 10/26/21 06:22





                                 10/26/21 06:22

## 2021-10-27 NOTE — P.DS
Providers


Date of admission: 


10/26/21 14:56





Expected date of discharge: 10/27/21


Attending physician: 


Werner Prajapati





Consults: 





                                        





10/25/21 14:13


Consult Physician Routine 


   Consulting Provider: Seda Cooper


   Consult Reason/Comments: Medical management


   Do you want consulting provider notified?: Yes











Primary care physician: 


Seda Cooper





Hospital Course: 





Date of admission: 10/25/2021


Date of discharge: 10/27/2021





Admission diagnosis: Status post right total knee arthroplasty


Discharge diagnosis: Same





Attending physician: Dr. Prajapati





Surgical procedures: Right total knee arthroplasty





Brief history: Patient is a 71-year-old female with a history of progressive 

primary right knee osteoarthritis.  At this point patient has failed 

conservative treatment measures and has opted to proceed with a elective right 

total knee arthroplasty.





Hospital course: Details of patient's surgery can be found in operative report. 

Patient tolerated the procedure well and was subsequently transported to 

orthopedic floor.  Patient's orthopeidc and medical care was provided daily. 

Patient had daily laboratory tests performed for evaluation of overall blood 

counts.  Patient had daily physical therapy to include strengthening range of 

motion as well as education with walker ambulation. Patient was treated with 

Lovenox for their postoperative DVT prophylaxis during their inpatient stay.  

Patient was noted to have a relatively uneventful postoperative course.  Patient

reported satisfactory pain control with oral pain medications by postoperative 

day 1.  Patient showed satisfactory progress with physical therapy.  Patient 

moved steadily through the program and had no difficulty meeting the goals by 

postoperative day 2.  Given patient's otherwise satisfactory course and having 

met physical therapy goals, plan is to discharge patient home on postoperative 

day 2.





Discharge condition/disposition: Patient will be discharged home in stable 

condition.





Discharge medications: Instructions are given on resumption of patient's normal 

daily medications per primary care recommendation, in addition patient will be 

prescribed Norco 5 mg/25 mg, Colace 100 mg, aspirin 81 mg.





Discharge instructions:


1.  Wound care and infection precautions, keep incision dry and covered while 

showering, no lotions, creams, moisturizers. No soaking, tubs, pools, hottubs. 

Do not scrub over the incision.


2.  Weight-bear as tolerated with walker / cane until follow-up.


3.  Ice and elevate when necessary.  Do not exceed 20 minutes per hour with ice 

pack.


4.  Utilize compression sleeve until seen at first follow up appointment.


5.  Visiting nursing care.


6.  Home physical therapy including home CPM.


7.  Pain meds and anticoagulants per prescription.


8.  Pain medication has potential to cause constipation. Increase oral fluid and

fiber intake. Contact primary care provider if you have not had a bowel movement

within 48 hours after discharge


9.  No anti-inflammatory medication until discussed at first post operative 

visit, this including Motrin, Aleve, Mobic, Diclofenac.


10.  Follow up in office at 2 weeks postop with Quan Perdomo PA-C/Jean Marie JIMENEZ


11. Follow up with your primary care doctor 7-10 days after discharge.


12. Contact [facility] with any questions, [phone].











Procedures: 





Right total knee arthroplasty


Patient Condition at Discharge: Good





Plan - Discharge Summary


Discharge Rx Participant: Yes


New Discharge Prescriptions: 


New


   Docusate [Colace] 100 mg PO DAILY #30 capsule


   Aspirin [Adult Low Dose Aspirin EC] 81 mg PO BID #60 tab


   HYDROcodone/APAP 5-325MG [Norco 5-325] 1 - 2 tab PO Q6HR PRN #42 tab


     PRN Reason: Pain





No Action


   Multivitamins, Thera [Multivitamin] 1 each PO DAILY


   Losartan [Cozaar] 50 mg PO HS


   Vitamin C/Biotin [Hair, Skin and Nails] 1 each PO DAILY


   Calcium Citrate/Vitamin D3 [Calcitrate + Vit D Caplet] 1 tab PO DAILY


   Pantoprazole Sodium [Protonix] 1 tab PO DAILY


   Ferrous Sulfate [Feosol] 325 mg PO TID


   hydroCHLOROthiazide 25 mg PO DAILY


   Pravastatin Sodium [Pravachol] 20 mg PO HS


Discharge Medication List





Multivitamins, Thera [Multivitamin] 1 each PO DAILY 07/21/14 [History]


Losartan [Cozaar] 50 mg PO HS 01/05/18 [History]


Vitamin C/Biotin [Hair, Skin and Nails] 1 each PO DAILY 01/05/18 [History]


Calcium Citrate/Vitamin D3 [Calcitrate + Vit D Caplet] 1 tab PO DAILY 08/09/18 

[History]


Pantoprazole Sodium [Protonix] 1 tab PO DAILY 08/11/18 [History]


Ferrous Sulfate [Feosol] 325 mg PO TID 11/07/18 [History]


Pravastatin Sodium [Pravachol] 20 mg PO HS 10/25/21 [History]


hydroCHLOROthiazide 25 mg PO DAILY 10/25/21 [History]


Aspirin [Adult Low Dose Aspirin EC] 81 mg PO BID #60 tab 10/27/21 [Rx]


Docusate [Colace] 100 mg PO DAILY #30 capsule 10/27/21 [Rx]


HYDROcodone/APAP 5-325MG [Norco 5-325] 1 - 2 tab PO Q6HR PRN #42 tab 10/27/21 

[Rx]








Follow up Appointment(s)/Referral(s): 


Seda Cooper MD [Primary Care Provider] - 1 Week


[facility],Equipment [NON-STAFF] - As Needed (*Please call [facility] once h

ome to arrange delivery of the Continuous Passive Motion (CPM) machine. )


[facility], [NON-STAFF] -  ([facility] will call you to schedule 

your home care/home physical therapy visits.)


Jomar Perdomo PAC [PHYSICIAN ASSISTANT] - 11/10/21 4:10 pm


Patient Instructions/Handouts:  *[facility] - On-Q Pain Pump Discharge 

Instructions, How to Use an Incentive Spirometer (DC), Joint Replacement Surgery

(DC)


Activity/Diet/Wound Care/Special Instructions: 


Orthopedic Discharge Instructions:


1.  Wound care and infection precautions, keep incision dry and covered while 

showering, no lotions, creams, moisturizers. No soaking, pools, hot tubs. Do not

scrub over incision.


2.  Weight-bear as tolerated with walker / cane until follow-up.


3.  Ice and elevate when necessary.  Do not exceed 20 minutes per hour with ice 

pack.


4.  Utilize compression sleeve until seen at first follow up appointment.


5.  Pain meds and anticoagulants per prescription.


6.  Pain medication has potential to cause constipation. Increase oral fluid and

fiber intake. Contact primary care provider if you have not had a bowel movement

within 48 hours after discharge.


7.  No anti-inflammatory medication until discussed at first post operative 

visit, this including Motrin, Aleve, Mobic, Diclofenac.


8. Follow up in office at 2 weeks postop with Quan Perdomo PA-C/Jean Marie Rios PA-C


9. Follow up with your primary care doctor 7-10 days after discharge.


10. Contact [facility] with any questions, [phone].





Wound care instructions:


1.  Okay to remove foam dressing on 11/01/2021


2.  Keep incision covered and dry while showering, both before and after removal

of the foam tape





Discharge Disposition: HOME WITH HOME HEALTH SERVICES

## 2022-06-27 ENCOUNTER — HOSPITAL ENCOUNTER (OUTPATIENT)
Dept: HOSPITAL 47 - RADBDWWP | Age: 72
Discharge: HOME | End: 2022-06-27
Attending: INTERNAL MEDICINE
Payer: MEDICARE

## 2022-06-27 DIAGNOSIS — M81.0: ICD-10-CM

## 2022-06-27 DIAGNOSIS — Z12.31: Primary | ICD-10-CM

## 2022-06-27 PROCEDURE — 77067 SCR MAMMO BI INCL CAD: CPT

## 2022-06-27 PROCEDURE — 77080 DXA BONE DENSITY AXIAL: CPT

## 2022-06-27 PROCEDURE — 77063 BREAST TOMOSYNTHESIS BI: CPT

## 2022-06-27 NOTE — BD
EXAMINATION TYPE: Axial Bone Density

 

DATE OF EXAM: 6/27/2022

 

COMPARISON: NONE

 

CLINICAL HISTORY: 72 years year old Female.  ICD-10 CODE: M81.0 AGE-RELATED OSTEOPOROSIS

 

Height:  62.5

Weight:  215

 

FRAX RISK QUESTIONS:

Alcohol (3 or more units per day):  NO

Family History (Parent hip fracture):  NO

Glucocorticoids (More than 3mos):  NO    

History of Fracture in Adulthood: YES, ANKLE

 

Secondary Osteoporosis:

  1.  Type 1 Diabetes: NO

  2.  Hyperthyroidism: NO

  3.  Menopause before 45: NO

  4.  Malnutrition: NO

  5.  Chronic liver disease: NO

Rheumatoid Arthritis: NO

Current Tobacco Use: NO

 

RISK FACTORS 

HISTORY OF: 

Hip Fracture (Right/Left): NO

Spine Fracture: NO

History of Wrist Fracture: NO

Surgery to Spine/Hip(right/left)/Wrist (right/left): NO

Family History of Osteoporosis: NO

Active: YES

Diet low in dairy products/other sources of calcium:  YES

Postmenopausal woman: YES

Take estrogen and/or progesterone medications: NO

Lost more than 2 inches in height since high school: NO

Frequent falls: NO

Poor Health: NO

Hyperparathyroidism: NO

Adrenal Insufficiency: NO

 

MEDICATIONS: 

Prednisone or other steroids: NO

Thyroid Medications:  NO

Osteoporosis Medications: NO

Additional Medications: CHOLESTEROL MEDS, BP MEDS, REFLUX MEDS, MULTI VIT., IRON

 

 

 

EXAM MEASUREMENTS: 

Bone mineral densitometry was performed using the light System.

Bone mineral density as measured about the Lumbar spine is:  

----- L1-L4(G/cm2): 1.220

T Score Values are as follows:

----- L1: -1.0

----- L2: -0.9

----- L3: 0.6

----- L4: 1.9

----- L1-L4: 0.3

Bone mineral density has: DECREASED 3.9 % since study of: 06/19/2014

 

Bone mineral density about the R hip (g/cm2): 1.041

Bone mineral density about the L hip (g/cm2): 0.901

T Score values are as follows:

-----R Neck: 0.0

-----L Neck: -1.0

-----R Total: 0.5

-----L Total: 0.4

Bone mineral density has: DECREASED 7.1 % since study of: 06/19/2014

 

 

FRAX%s: The graph provided illustrates a 13.2% chance for a major osteoporotic fx and a 1.6% chance f
or the hips probability for fx in 10 years time.

 

IMPRESSION:

Normal (Values between +1 and -1 indicate normal bone mass).  Consider repeating this study in 5 year
s or sooner if there is some new clinical indication.

 

NOTE:  T-SCORE=SD OF THE YOUNG ADULT MEAN.

## 2022-06-28 NOTE — MM
Reason for Exam: Screening  (asymptomatic). 

Last mammogram was performed 6 year(s) and 11 month(s) ago. 





Patient History: 

Menarche at age 11. First Full-Term Pregnancy at age 23. Postmenopausal. 





Risk Values: 

Chel 5 year model risk: 2.0%.

NCI Lifetime model risk: 4.5%.





Prior Study Comparison: 

6/18/2013 Bilateral Screening Mammogram, [facility]. 6/19/2014 Bilateral Screening Mammogram, [facility].

7/22/2015 Bilateral Screening Mammogram, [facility]. 





Tissue Density: 

There are scattered fibroglandular densities.





Findings: 

Analyzed By CAD. 

Pattern appears symmetrical. Multiple benign-appearing scattered calcifications are present. There

is benign appearing increased density at the level of the right nipple.

No suspicious groups of microcalcifications, spiculated or lobular masses, architectural distortion

or other secondary signs of malignancy are mammographically apparent. 





Overall Assessment: Benign, BI-RAD 2





Management: 

Screening Mammogram of both breasts in 1 year.

A negative mammogram report should not preclude additional follow up of suspicious palpable

abnormalities.

Patient should continue monthly self breast exam.

A clinical breast exam by your physician is recommended on an annual basis and results should be

correlated with mammographic findings.



Electronically signed and approved by: Rigoberto Aviles D.O. Radiologis

## 2024-10-17 ENCOUNTER — HOSPITAL ENCOUNTER (OUTPATIENT)
Dept: HOSPITAL 47 - RADMAMWWP | Age: 74
Discharge: HOME | End: 2024-10-17
Attending: INTERNAL MEDICINE
Payer: MEDICARE

## 2024-10-17 PROCEDURE — 77067 SCR MAMMO BI INCL CAD: CPT

## 2024-10-17 PROCEDURE — 77063 BREAST TOMOSYNTHESIS BI: CPT

## 2024-10-18 NOTE — MM
Reason for Exam: Screening  (asymptomatic). 

Last mammogram was performed 2 year(s) and 4 month(s) ago. 





Patient History: 

Menarche at age 11. First Full-Term Pregnancy at age 23. Postmenopausal. 





Risk Values: 

Chel 5 year model risk: 2.0%.

NCI Lifetime model risk: 4.0%.





Prior Study Comparison: 

6/19/2014 Bilateral Screening Mammogram, [facility]. 7/22/2015 Bilateral Screening Mammogram, [facility].

6/27/2022 Bilateral MG 3D screening mammo w/cad, [facility]. 





Tissue Density: 

The breasts are almost entirely fatty.





Findings: 

Analyzed By CAD. 

Right breast: There is no suspicious group of microcalcifications or new suspicious mass.

Benign-appearing calcifications right breast.



Left breast: There is no suspicious group of microcalcifications or new suspicious mass. 





Overall Assessment: Benign, BI-RAD 2





Management: 

Screening Mammogram of both breasts in 1 year.

[facility] will attempt to contact patient to return for supplemental views and

ultrasound if indicated.



Patient should continue monthly self-breast exams.  A clinical breast exam by your physician is

recommended on an annual basis.

This exam should not preclude additional follow-up of suspicious palpable abnormalities.



Note on Chel scores and lifetime risk:

1. A Chel score greater than 3% is considered moderate risk. If this is the case, consider

specialist referral to assess eligibility for a risk reducing agent.

2. If overall lifetime risk for the development of breast cancer is 20% or higher, the patient may

qualify for future screening with alternating mammogram and breast MRI.



[facility], Workstation: [id number], 10/18/2024 9:24 AM.



Electronically signed and approved by: Orville Melchor DO

## 2025-03-27 ENCOUNTER — HOSPITAL ENCOUNTER (OUTPATIENT)
Dept: HOSPITAL 47 - CATHCVL | Age: 75
Discharge: HOME | End: 2025-03-27
Attending: INTERNAL MEDICINE
Payer: MEDICARE

## 2025-03-27 VITALS — TEMPERATURE: 98.4 F

## 2025-03-27 VITALS — RESPIRATION RATE: 16 BRPM

## 2025-03-27 VITALS — HEART RATE: 75 BPM

## 2025-03-27 VITALS — SYSTOLIC BLOOD PRESSURE: 122 MMHG | DIASTOLIC BLOOD PRESSURE: 64 MMHG

## 2025-03-27 DIAGNOSIS — I25.10: Primary | ICD-10-CM

## 2025-03-27 DIAGNOSIS — E78.5: ICD-10-CM

## 2025-03-27 DIAGNOSIS — I12.9: ICD-10-CM

## 2025-03-27 DIAGNOSIS — E66.3: ICD-10-CM

## 2025-03-27 DIAGNOSIS — Z79.82: ICD-10-CM

## 2025-03-27 DIAGNOSIS — I38: ICD-10-CM

## 2025-03-27 DIAGNOSIS — Z88.2: ICD-10-CM

## 2025-03-27 DIAGNOSIS — N18.9: ICD-10-CM

## 2025-03-27 PROCEDURE — 93458 L HRT ARTERY/VENTRICLE ANGIO: CPT

## 2025-03-27 RX ADMIN — VERAPAMIL HYDROCHLORIDE ONE ML: 2.5 INJECTION, SOLUTION INTRAVENOUS at 12:38

## 2025-03-27 RX ADMIN — ASPIRIN 325 MG ORAL TABLET ONE: 325 PILL ORAL at 08:59

## 2025-03-27 RX ADMIN — POTASSIUM CHLORIDE ONE MLS: 14.9 INJECTION, SOLUTION INTRAVENOUS at 09:00

## 2025-03-27 RX ADMIN — NICARDIPINE HYDROCHLORIDE ONE MLS: 2.5 INJECTION INTRAVENOUS at 13:15

## 2025-03-27 RX ADMIN — METHYLENE BLUE PRN MLS: 10 INJECTION INTRAVENOUS at 12:26

## 2025-03-27 RX ADMIN — ENALAPRILAT STA MG: 1.25 INJECTION INTRAVENOUS at 09:24

## 2025-03-27 RX ADMIN — IOPAMIDOL ONE ML: 755 INJECTION, SOLUTION INTRAVENOUS at 12:48

## 2025-03-27 RX ADMIN — CEFAZOLIN SCH MLS/HR: 330 INJECTION, POWDER, FOR SOLUTION INTRAMUSCULAR; INTRAVENOUS at 08:51

## 2025-03-27 RX ADMIN — CEFAZOLIN PRN MLS: 330 INJECTION, POWDER, FOR SOLUTION INTRAMUSCULAR; INTRAVENOUS at 12:26

## 2025-03-27 RX ADMIN — HYDRALAZINE HYDROCHLORIDE STA: 20 INJECTION INTRAMUSCULAR; INTRAVENOUS at 09:25

## 2025-03-27 RX ADMIN — HEPARIN SODIUM ONE UNIT: 1000 INJECTION, SOLUTION INTRAVENOUS; SUBCUTANEOUS at 12:45

## 2025-03-27 RX ADMIN — MIDAZOLAM ONE MG: 1 INJECTION INTRAMUSCULAR; INTRAVENOUS at 12:38

## 2025-03-27 RX ADMIN — LIDOCAINE HYDROCHLORIDE ONE ML: 10 INJECTION, SOLUTION INFILTRATION; PERINEURAL at 12:38

## 2025-03-27 NOTE — P.PCN
Date of Procedure: 03/27/25


Operative Findings: 








CARDIAC CATHETERIZATION





PERFORMING PHYSICIAN: 


Santos Fountain MD, RPVI





PROCEDURE PERFORMED:


1.  Selective right and left coronary angiogram


2.  Left heart catheterization


3.  Ultrasound-guided access of the right radial artery





INDICATION:


Symptomatic 74-year-old female patient with abnormal myocardial perfusion 

imaging stress test





COMPLICATION:


None





APPROACH:


Right radial artery





LEVEL OF SEDATION:


Moderate with a sedation length of 12 minutes





PROCEDURE DESCRIPTION:


After obtaining an informed consent, the patient was brought to cardiac cath 

lab.  Local anesthesia was performed using lidocaine subcutaneously.  The right 

radial artery was cannulated using Seldinger technique, under ultrasound 

guidance,  the guidewire passed easily, following that we advanced a 5-Kinyarwanda 

sheath dilator assembly, the wire and dilator were removed and sheath was 

flushed.  





Following that, 2 mg of verapamil along with 5000 unit heparin were given.





Selective right and left coronary angiogram using a 5-Kinyarwanda JR4 and JL 3.5 

catheters.  





Following that we did left heart catheterization using 5-Kinyarwanda pigtail 

catheter.  





The procedure was completed there was no complication.





SELECTIVE CORONARY ANGIOGRAM:


The right coronary artery:


Medium caliber vessel nondominant vessel





Left main:


Is angiographically normal





The left circumflex:


Large-caliber vessel and a dominant vessel appears to be angiographically normal

with no evidence of high-grade stenosis





The left anterior descending artery:


Large-caliber vessel with mild disease involving the proximal portion





HEMODYNAMICS:


The LVEDP was 18 mmHg with no gradient was identified across aortic valve





CONCLUSION:


1.  Mild coronary artery disease involving the proximal LAD


2.  Mildly elevated left-sided filling pressure





POSTPROCEDURE MANAGEMENT:


Medical treatment

## 2025-05-13 ENCOUNTER — HOSPITAL ENCOUNTER (OUTPATIENT)
Dept: HOSPITAL 47 - RADUSWWP | Age: 75
Discharge: HOME | End: 2025-05-13
Attending: INTERNAL MEDICINE
Payer: MEDICARE

## 2025-05-13 DIAGNOSIS — R94.4: ICD-10-CM

## 2025-05-13 DIAGNOSIS — N18.9: Primary | ICD-10-CM

## 2025-05-13 DIAGNOSIS — N88.8: ICD-10-CM

## 2025-05-13 PROCEDURE — 76770 US EXAM ABDO BACK WALL COMP: CPT

## 2025-06-18 ENCOUNTER — HOSPITAL ENCOUNTER (OUTPATIENT)
Dept: HOSPITAL 47 - RADUSWWP | Age: 75
Discharge: HOME | End: 2025-06-18
Attending: INTERNAL MEDICINE
Payer: MEDICARE

## 2025-06-18 DIAGNOSIS — M99.81: Primary | ICD-10-CM

## 2025-06-18 PROCEDURE — 76856 US EXAM PELVIC COMPLETE: CPT

## 2025-06-18 PROCEDURE — 76830 TRANSVAGINAL US NON-OB: CPT
